# Patient Record
Sex: FEMALE | Race: WHITE | NOT HISPANIC OR LATINO | Employment: FULL TIME | URBAN - METROPOLITAN AREA
[De-identification: names, ages, dates, MRNs, and addresses within clinical notes are randomized per-mention and may not be internally consistent; named-entity substitution may affect disease eponyms.]

---

## 2017-03-01 ENCOUNTER — ALLSCRIPTS OFFICE VISIT (OUTPATIENT)
Dept: OTHER | Facility: OTHER | Age: 52
End: 2017-03-01

## 2017-03-01 DIAGNOSIS — Z12.31 ENCOUNTER FOR SCREENING MAMMOGRAM FOR MALIGNANT NEOPLASM OF BREAST: ICD-10-CM

## 2017-03-13 ENCOUNTER — GENERIC CONVERSION - ENCOUNTER (OUTPATIENT)
Dept: OTHER | Facility: OTHER | Age: 52
End: 2017-03-13

## 2017-03-13 LAB
CHOLEST SERPL-MCNC: 161 MG/DL
GLUCOSE SERPL-MCNC: 90 MG/DL
HDLC SERPL-MCNC: 49 MG/DL
LDLC SERPL CALC-MCNC: 93 MG/DL
TRIGL SERPL-MCNC: 95 MG/DL
TSH SERPL DL<=0.05 MIU/L-ACNC: 3.04 M[IU]/L

## 2017-04-28 ENCOUNTER — HOSPITAL ENCOUNTER (OUTPATIENT)
Dept: RADIOLOGY | Facility: HOSPITAL | Age: 52
Discharge: HOME | End: 2017-04-28
Attending: FAMILY MEDICINE
Payer: COMMERCIAL

## 2017-04-28 ENCOUNTER — GENERIC CONVERSION - ENCOUNTER (OUTPATIENT)
Dept: OTHER | Facility: OTHER | Age: 52
End: 2017-04-28

## 2017-04-28 DIAGNOSIS — Z12.31 ENCOUNTER FOR SCREENING MAMMOGRAM FOR MALIGNANT NEOPLASM OF BREAST: ICD-10-CM

## 2017-04-28 PROCEDURE — G0202 SCR MAMMO BI INCL CAD: HCPCS

## 2017-06-08 ENCOUNTER — HOSPITAL ENCOUNTER (EMERGENCY)
Facility: HOSPITAL | Age: 52
Discharge: HOME | End: 2017-06-09
Attending: EMERGENCY MEDICINE | Admitting: EMERGENCY MEDICINE
Payer: COMMERCIAL

## 2017-06-08 ENCOUNTER — APPOINTMENT (EMERGENCY)
Dept: CT IMAGING | Facility: HOSPITAL | Age: 52
End: 2017-06-08
Payer: COMMERCIAL

## 2017-06-08 DIAGNOSIS — J02.9 PHARYNGITIS: Primary | ICD-10-CM

## 2017-06-08 DIAGNOSIS — J36 TONSILLAR ABSCESS: ICD-10-CM

## 2017-06-08 DIAGNOSIS — J03.90 TONSILLITIS: ICD-10-CM

## 2017-06-08 PROCEDURE — 70491 CT SOFT TISSUE NECK W/DYE: CPT

## 2017-06-09 VITALS
DIASTOLIC BLOOD PRESSURE: 67 MMHG | TEMPERATURE: 97.6 F | SYSTOLIC BLOOD PRESSURE: 110 MMHG | RESPIRATION RATE: 18 BRPM | HEART RATE: 80 BPM | OXYGEN SATURATION: 97 % | WEIGHT: 194.89 LBS

## 2017-06-09 LAB
ALBUMIN SERPL BCP-MCNC: 3.6 G/DL (ref 3.5–5)
ALP SERPL-CCNC: 89 U/L (ref 46–116)
ALT SERPL W P-5'-P-CCNC: 27 U/L (ref 12–78)
ANION GAP SERPL CALCULATED.3IONS-SCNC: 9 MMOL/L (ref 4–13)
AST SERPL W P-5'-P-CCNC: 14 U/L (ref 5–45)
B-HCG SERPL-ACNC: <2 MIU/ML
BASOPHILS # BLD AUTO: 0.05 THOUSANDS/ΜL (ref 0–0.1)
BASOPHILS NFR BLD AUTO: 0 % (ref 0–1)
BILIRUB SERPL-MCNC: 0.4 MG/DL (ref 0.2–1)
BUN SERPL-MCNC: 15 MG/DL (ref 5–25)
CALCIUM SERPL-MCNC: 8.7 MG/DL (ref 8.3–10.1)
CHLORIDE SERPL-SCNC: 102 MMOL/L (ref 100–108)
CO2 SERPL-SCNC: 28 MMOL/L (ref 21–32)
CREAT SERPL-MCNC: 0.71 MG/DL (ref 0.6–1.3)
EOSINOPHIL # BLD AUTO: 0.3 THOUSAND/ΜL (ref 0–0.61)
EOSINOPHIL NFR BLD AUTO: 2 % (ref 0–6)
ERYTHROCYTE [DISTWIDTH] IN BLOOD BY AUTOMATED COUNT: 13.3 % (ref 11.6–15.1)
GFR SERPL CREATININE-BSD FRML MDRD: >60 ML/MIN/1.73SQ M
GLUCOSE SERPL-MCNC: 108 MG/DL (ref 65–140)
HCT VFR BLD AUTO: 41.8 % (ref 34.8–46.1)
HGB BLD-MCNC: 13.5 G/DL (ref 11.5–15.4)
LACTATE SERPL-SCNC: 0.6 MMOL/L (ref 0.5–2)
LYMPHOCYTES # BLD AUTO: 1.34 THOUSANDS/ΜL (ref 0.6–4.47)
LYMPHOCYTES NFR BLD AUTO: 10 % (ref 14–44)
MCH RBC QN AUTO: 30.5 PG (ref 26.8–34.3)
MCHC RBC AUTO-ENTMCNC: 32.3 G/DL (ref 31.4–37.4)
MCV RBC AUTO: 95 FL (ref 82–98)
MONOCYTES # BLD AUTO: 0.99 THOUSAND/ΜL (ref 0.17–1.22)
MONOCYTES NFR BLD AUTO: 8 % (ref 4–12)
NEUTROPHILS # BLD AUTO: 10.22 THOUSANDS/ΜL (ref 1.85–7.62)
NEUTS SEG NFR BLD AUTO: 80 % (ref 43–75)
PLATELET # BLD AUTO: 205 THOUSANDS/UL (ref 149–390)
PMV BLD AUTO: 9.3 FL (ref 8.9–12.7)
POTASSIUM SERPL-SCNC: 4 MMOL/L (ref 3.5–5.3)
PROT SERPL-MCNC: 7.7 G/DL (ref 6.4–8.2)
RBC # BLD AUTO: 4.42 MILLION/UL (ref 3.81–5.12)
S PYO AG THROAT QL: NEGATIVE
SODIUM SERPL-SCNC: 139 MMOL/L (ref 136–145)
WBC # BLD AUTO: 12.9 THOUSAND/UL (ref 4.31–10.16)

## 2017-06-09 PROCEDURE — 99284 EMERGENCY DEPT VISIT MOD MDM: CPT

## 2017-06-09 PROCEDURE — 96375 TX/PRO/DX INJ NEW DRUG ADDON: CPT

## 2017-06-09 PROCEDURE — 87070 CULTURE OTHR SPECIMN AEROBIC: CPT | Performed by: EMERGENCY MEDICINE

## 2017-06-09 PROCEDURE — 83605 ASSAY OF LACTIC ACID: CPT | Performed by: EMERGENCY MEDICINE

## 2017-06-09 PROCEDURE — 96361 HYDRATE IV INFUSION ADD-ON: CPT

## 2017-06-09 PROCEDURE — 87430 STREP A AG IA: CPT | Performed by: EMERGENCY MEDICINE

## 2017-06-09 PROCEDURE — 84702 CHORIONIC GONADOTROPIN TEST: CPT | Performed by: EMERGENCY MEDICINE

## 2017-06-09 PROCEDURE — 36415 COLL VENOUS BLD VENIPUNCTURE: CPT | Performed by: EMERGENCY MEDICINE

## 2017-06-09 PROCEDURE — 87040 BLOOD CULTURE FOR BACTERIA: CPT | Performed by: EMERGENCY MEDICINE

## 2017-06-09 PROCEDURE — 80053 COMPREHEN METABOLIC PANEL: CPT | Performed by: EMERGENCY MEDICINE

## 2017-06-09 PROCEDURE — 85025 COMPLETE CBC W/AUTO DIFF WBC: CPT | Performed by: EMERGENCY MEDICINE

## 2017-06-09 PROCEDURE — 96365 THER/PROPH/DIAG IV INF INIT: CPT

## 2017-06-09 RX ORDER — CLINDAMYCIN HYDROCHLORIDE 150 MG/1
300 CAPSULE ORAL 3 TIMES DAILY
Qty: 60 CAPSULE | Refills: 0 | Status: SHIPPED | OUTPATIENT
Start: 2017-06-09 | End: 2017-06-19

## 2017-06-09 RX ADMIN — IOHEXOL 80 ML: 350 INJECTION, SOLUTION INTRAVENOUS at 01:00

## 2017-06-09 RX ADMIN — DEXAMETHASONE SODIUM PHOSPHATE 10 MG: 10 INJECTION, SOLUTION INTRAMUSCULAR; INTRAVENOUS at 01:19

## 2017-06-09 RX ADMIN — SODIUM CHLORIDE 3 G: 9 INJECTION, SOLUTION INTRAVENOUS at 01:30

## 2017-06-09 RX ADMIN — SODIUM CHLORIDE 1000 ML: 0.9 INJECTION, SOLUTION INTRAVENOUS at 00:17

## 2017-06-11 LAB — BACTERIA THROAT CULT: NORMAL

## 2017-06-14 LAB
BACTERIA BLD CULT: NORMAL
BACTERIA BLD CULT: NORMAL

## 2018-01-12 VITALS
RESPIRATION RATE: 16 BRPM | WEIGHT: 204 LBS | BODY MASS INDEX: 37.54 KG/M2 | HEART RATE: 80 BPM | DIASTOLIC BLOOD PRESSURE: 78 MMHG | TEMPERATURE: 98.4 F | HEIGHT: 62 IN | SYSTOLIC BLOOD PRESSURE: 122 MMHG

## 2018-01-15 NOTE — RESULT NOTES
Discussion/Summary   Your mammogram is normal   Please repeat mammogram in 1 year  Dr Davion Ortiz CAD 28Apr2017 09:36AM Chidi Meier Order Number: [id number]    - Patient Instructions: To schedule this appointment, please contact Central Scheduling at [phone]  Do not wear any perfume, powder, lotion or deodorant on breast or underarm area  Please bring your doctors order, referral (if needed) and insurance information with you on the day of the test  Failure to bring this information may result in this test being rescheduled  Arrive 15 minutes prior to your appointment time to register  On the day of your test, please bring any prior mammogram or breast studies with you that were not performed at a [facility]  Failure to bring prior exams may result in your test needing to be rescheduled  Test Name Result Flag Reference   MAMMO SCREENING BILATERAL W CAD (Report)     Patient History:   Patient's BMI is 36 1  Reason for exam: screening, asymptomatic  Screening     Mammo Screening Bilateral W CAD: April 28, 2017 - Check In #:    [de-identified]   Bilateral CC and MLO view(s) were taken  Technologist: Isaias Mortimer, RTRM   Prior study comparison: November 4, 2011, bilateral screening    mammogram performed at [place] [address]  August 10, 2009, bilateral diagnostic mammogram performed at [place] [address]  The breast tissue is heterogeneously dense, potentially limiting    the sensitivity of mammography  Patient risk, included in this    report, assists in determining the appropriate screening regimen    (such as 3-D mammography or the inclusion of automated breast    ultrasound or MRI)  3-D mammography may also remain indicated as    screening  Bilateral digital mammography is performed  No    dominant soft tissue mass, architectural distortion or suspicious   calcifications are noted  The skin and nipple contours are    within normal limits  Scattered benign appearing calcifications    are noted as are bilateral intramammary lymph nodes  No evidence    of malignancy  No significant changes when compared to prior    studies  1  No evidence of malignancy  2  No significant change when compared with the prior study  ACR BI-RADSï¾® Assessments: BiRad:2 - Benign     Recommendation:   Routine screening mammogram of both breasts in 1 year  A reminder letter will be scheduled   Analyzed by CAD     8-10% of cancers will be missed on mammography  Management of a    palpable abnormality must be based on clinical grounds  Patients   will be notified of their results via letter from our facility  Accredited by [employer] of [employer] and FDA       Transcription Location: VANESA Hurley 98: [id number]     Risk Value(s):   Tyrer-Cuzick 10 Year: 1 700%, Tyrer-Cuzick Lifetime: 6 800%,    Myriad Table: 1 5%, ROHITH 5 Year: 0 7%, NCI Lifetime: 5 8%   Signed by:   Aneta Rahman MD   4/28/17

## 2018-01-18 NOTE — PROGRESS NOTES
Assessment    1  History of Well adult exam (V70 0) (Z00 00)   2  Obesity (278 00) (E66 9)   3  Screen for colon cancer (V76 51) (Z12 11)   4  Encounter for screening mammogram for malignant neoplasm of breast (V76 12)   (Z12 31)   5  Anxiety (300 00) (F41 9)   6  Need for Tdap vaccination (V06 1) (Z23)   7  HLD (hyperlipidemia) (272 4) (E78 5)    Plan  Anxiety    · ALPRAZolam 0 25 MG Oral Tablet (Xanax); TAKE 1 TABLET AT BEDTIME  Encounter for screening mammogram for malignant neoplasm of breast    · * MAMMO SCREENING BILATERAL W CAD; Status:Hold For - Scheduling; Requested  for:08Apr2016;   HLD (hyperlipidemia), Obesity    · (1) CBC/PLT/DIFF; Status:Active; Requested for:13May2016;    · (1) COMPREHENSIVE METABOLIC PANEL; Status:Active; Requested for:13May2016;    · (1) LIPID PANEL, FASTING; Status:Active; Requested for:13May2016;    · (1) TSH; Status:Active; Requested for:13May2016;    · (1) URINALYSIS (will reflex a microscopy if leukocytes, occult blood, protein or nitrites  are not within normal limits); Status:Active; Requested for:13May2016;    · Eat no more than 30 grams of fat per day ; Status:Complete;   Done: 74MEY0779   · Some eating tips that can help you lose weight ; Status:Complete;   Done: 19WBV5818  Need for Tdap vaccination    · Stop: Adacel 5-2-15 5 LF-MCG/0 5 Intramuscular Suspension  Screen for colon cancer    · COLONOSCOPY; Status:Active; Requested for:08Apr2016;    · 1 - Hollie Olivera MD, Awais Fajardo (Gastroenterology) Physician Referral  Consult  Status: Active   Requested for: 08Apr2016  Care Summary provided  : Yes   · Follow-up visit in 1 month Evaluation and Treatment  Follow-up  Status: Hold For -  Scheduling  Requested for: 08Apr2016    Discussion/Summary    Well exam- pap is UTD, will give script for mammogram and c-scope   -refused TDaP this year  obesity- pt is getting an elliptical machine and we discussed heart healthy diet  anxiety-well controlled   pt uses xanax sparingly ---refill given  hyperlipidemia- discussed starting medication, however pt would like to work on diet and exerise  I explained to patient that with family hx of CAD- I don't want to wait for medication for more than 1 month  will repeat bw in 1 month will f/u in office  Chief Complaint  Physical for work  History of Present Illness  HM, Adult Female: The patient is being seen for a health maintenance evaluation  The last health maintenance visit was 1 year(s) ago  General Health: The patient's health since the last visit is described as good  She complains of vision problems  Immunizations status: not up to date   blind since birth  Lifestyle:  She does not have a healthy diet  She has weight concerns  She does not exercise regularly  She does not use tobacco    Screening: Cervical cancer screening includes sees gyn but last pap approx 1 year ago  Breast cancer screening includes uncertain timing of her last mammogram  Colorectal cancer screening includes no previous colonoscopy  Metabolic screening includes lipid profile performed 4/4/16 and glucose screening performed 4/4/16  HPI: Pt is here for physical  Wellness exam for work  Pt is due for mammogram  Pt's last gyn exam was approx 1 year ago  Pt needs a colonoscopy script but stated that she is unsure if she will get it this year  Pt is working on her diet  Pt is getting an elliptical machine  Pt stated that she has occasional anxiety from work and will take xanax as needed  Last refilled 11/15  After exam was completed but still in the room, lipid profile resulted and abnormal  total cholesterol=220  hdl=47  GG=371  UEI=438      Active Problems    1  Anxiety (300 00) (F41 9)   2  Edema (782 3) (R60 9)   3  Encounter for screening mammogram for malignant neoplasm of breast (V76 12)   (Z12 31)   4  Lump of skin (782 2) (R22 9)   5  Obesity (278 00) (E66 9)   6   Screen for colon cancer (V76 51) (Z12 11)    Past Medical History    · History of acute bronchitis (V12 69) (Z87 09)   · History of obesity (V13 89) (Z86 39)   · History of Lump of left breast (611 72) (N63)   · History of Skin lesion (709 9) (L98 9)   · History of Well adult exam (V70 0) (Z00 00)    Surgical History    · History of Cholecystectomy    Family History    · Family history of Coronary Artery Disease (V17 49)   · Family history of No Significant Family History    Social History    · Being A Social Drinker   · Never A Smoker    Current Meds   1  ALPRAZolam 0 25 MG Oral Tablet; TAKE 1 TABLET AT BEDTIME; Therapy: 10Xsl3275 to (Evaluate:33Qaw1541); Last Rx:02Nov2015 Ordered    Allergies    1  No Known Drug Allergies    Vitals   Recorded: 08Apr2016 03:41PM   Temperature 99 4 F   Heart Rate 81   Respiration 18   Systolic 480   Diastolic 80   Height 5 ft 2 in   Weight 211 lb    BMI Calculated 38 59   BSA Calculated 1 96   O2 Saturation 97     Physical Exam    Constitutional   General appearance: Abnormal   obese  Head and Face   Head and face: Normal     Eyes   Conjunctiva and lids: No swelling, erythema or discharge  Pupils and irises: Abnormal   left strabismus + horizontal nystagmus  Ears, Nose, Mouth, and Throat   External inspection of ears and nose: Normal     Otoscopic examination: Tympanic membranes translucent with normal light reflex  Canals patent without erythema  Nasal mucosa, septum, and turbinates: Normal without edema or erythema  Lips, teeth, and gums: Normal, good dentition  Oropharynx: Normal with no erythema, edema, exudate or lesions  Neck   Neck: Supple, symmetric, trachea midline, no masses  Thyroid: Normal, no thyromegaly  Pulmonary   Respiratory effort: No increased work of breathing or signs of respiratory distress  Auscultation of lungs: Clear to auscultation  Cardiovascular   Auscultation of heart: Normal rate and rhythm, normal S1 and S2, no murmurs  Carotid pulses: 2+ bilaterally      Abdominal aorta: Normal     Pedal pulses: 2+ bilaterally  Examination of extremities for edema and/or varicosities: Normal     Abdomen   Abdomen: Non-tender, no masses  Liver and spleen: No hepatomegaly or splenomegaly  Lymphatic   Palpation of lymph nodes in neck: No lymphadenopathy  Musculoskeletal   Gait and station: Normal     Range of motion: Normal     Muscle strength/tone: Normal     Neurologic   Reflexes: 2+ and symmetric  Psychiatric   Judgment and insight: Normal     Orientation to person, place, and time: Normal     Mood and affect: Normal        Results/Data  PHQ-2 Adult Depression Screening 08Apr2016 03:52PM [facility]     Test Name Result Flag Reference   PHQ-2 Adult Depression Score 0     Q1: 0, Q2: 0   PHQ-2 Adult Depression Screening Negative         Signatures   Electronically signed by : Rj Bishop DO;  Apr 8 2016  4:30PM EST                       (Author)

## 2018-04-16 ENCOUNTER — OFFICE VISIT (OUTPATIENT)
Dept: FAMILY MEDICINE CLINIC | Facility: CLINIC | Age: 53
End: 2018-04-16
Payer: COMMERCIAL

## 2018-04-16 VITALS
TEMPERATURE: 97.6 F | HEIGHT: 61 IN | DIASTOLIC BLOOD PRESSURE: 78 MMHG | SYSTOLIC BLOOD PRESSURE: 122 MMHG | BODY MASS INDEX: 35.98 KG/M2 | HEART RATE: 80 BPM | RESPIRATION RATE: 16 BRPM | WEIGHT: 190.6 LBS

## 2018-04-16 DIAGNOSIS — Z00.00 PHYSICAL EXAM, ANNUAL: Primary | ICD-10-CM

## 2018-04-16 DIAGNOSIS — Z12.11 SCREENING FOR COLORECTAL CANCER: ICD-10-CM

## 2018-04-16 DIAGNOSIS — Z12.12 SCREENING FOR COLORECTAL CANCER: ICD-10-CM

## 2018-04-16 DIAGNOSIS — Z12.39 SCREENING FOR MALIGNANT NEOPLASM OF BREAST: ICD-10-CM

## 2018-04-16 DIAGNOSIS — F41.9 ANXIETY: ICD-10-CM

## 2018-04-16 DIAGNOSIS — E78.2 MIXED HYPERLIPIDEMIA: ICD-10-CM

## 2018-04-16 PROCEDURE — 99396 PREV VISIT EST AGE 40-64: CPT | Performed by: FAMILY MEDICINE

## 2018-04-16 RX ORDER — ALPRAZOLAM 0.25 MG/1
0.25 TABLET ORAL
Qty: 30 TABLET | Refills: 0 | Status: SHIPPED | OUTPATIENT
Start: 2018-04-16 | End: 2018-05-03 | Stop reason: SDUPTHER

## 2018-04-16 RX ORDER — ALPRAZOLAM 0.25 MG/1
1 TABLET ORAL
COMMUNITY
Start: 2013-09-17 | End: 2018-04-16 | Stop reason: SDUPTHER

## 2018-04-16 NOTE — PATIENT INSTRUCTIONS
Reviewed with patient the appropriate health maintenance testing  needed  Discussed the importance of diet and exercise and the role it plays in prevention of disease  Discussed with patient the importance of emotional health as well as physical health  Immunizations reviewed and discussed  Long discussion regarding dietary modification with types of food and portion control  Discussed how small goals will turn into larger goals  Discussed carbohydrate choices  Discussed how sugar is the enemy to avoid

## 2018-04-16 NOTE — PROGRESS NOTES
FAMILY PRACTICE HEALTH MAINTENANCE OFFICE VISIT  [facility] - [place]    NAME: Eagle Knight  AGE: 47 y o  SEX: female  : 1965     DATE: 2018    Assessment and Plan     Problem List Items Addressed This Visit     Hyperlipidemia    Anxiety    Relevant Medications    ALPRAZolam (XANAX) 0 25 mg tablet      Other Visit Diagnoses     Physical exam, annual    -  Primary    BMI 36 0-36 9,adult        Screening for malignant neoplasm of breast        Relevant Orders    Mammo screening bilateral w cad    Screening for colorectal cancer        Relevant Orders    Ambulatory referral to Gastroenterology          Reviewed with patient the appropriate health maintenance testing  needed  Discussed the importance of diet and exercise and the role it plays in prevention of disease  Discussed with patient the importance of emotional health as well as physical health  Immunizations reviewed and discussed  Long discussion regarding dietary modification with types of food and portion control  Discussed how small goals will turn into larger goals  Discussed carbohydrate choices  Discussed how sugar is the enemy to avoid  · Patient Counseling:   · Nutrition: Stressed importance of a well balanced diet, moderation of sodium/saturated fat, caloric balance and sufficient intake of fiber  · Exercise: Stressed the importance of regular exercise with a goal of 150 minutes per week  · Dental Health: Discussed daily flossing and brushing and regular dental visits   · Injury Prevention: Discussed Safety Belts, Safety Helmets, and Smoke Detectors    · Immunizations reviewed  Refused   · Discussed benefits of screening mammo and c-scope  · Discussed the patient's BMI with her  The BMI is above average; BMI management plan is completed     Return if symptoms worsen or fail to improve          Chief Complaint     Chief Complaint   Patient presents with    Physical Exam       History of Present Illness     Pt seen and examined  Here for CPE  Scheduled mammogram  Needs new referral for c-scope    Feels well  Pt is doing well with diet and exercise  Lost 14lbs since last visit  At the gym 4-5 days a week  Pt is staying away from carbs  Pt had health risk assessment at work and score went down  ldl increased to 156  tg wnl  Hdl up to 58  bp wnl  Total chol increased to 238              Well Adult Physical   Patient here for a comprehensive physical exam       Diet and Physical Activity  Diet: well balanced diet  Weight concerns: Patient has class 2 obesity (BMI 35 0-39  9)  Exercise: frequently      Depression Screen  PHQ-9 Depression Screening    PHQ-9:    Frequency of the following problems over the past two weeks:       Little interest or pleasure in doing things:  0 - not at all  Feeling down, depressed, or hopeless:  0 - not at all  PHQ-2 Score:  0          General Health  Hearing: Normal:  bilateral  Vision: legally blind  Dental:     Reproductive Health  Not addressed     The following portions of the patient's history were reviewed and updated as appropriate: allergies, current medications, past family history, past medical history, past social history, past surgical history and problem list     Review of Systems     Review of Systems   Constitutional: Negative for activity change, appetite change and fatigue  Eyes: Positive for visual disturbance  Respiratory: Negative for cough and chest tightness  Cardiovascular: Negative for chest pain, palpitations and leg swelling  Gastrointestinal: Negative for abdominal pain, constipation, diarrhea, nausea and vomiting  Genitourinary: Negative for difficulty urinating  Musculoskeletal: Negative for arthralgias and myalgias  Neurological: Negative for dizziness, weakness and headaches  Hematological: Negative for adenopathy  Psychiatric/Behavioral: Negative for behavioral problems  The patient is not nervous/anxious          Past Medical History     Past Medical History:   Diagnosis Date    Lump of left breast     Obesity     Skin lesion        Past Surgical History     Past Surgical History:   Procedure Laterality Date    CHOLECYSTECTOMY         Social History     Social History     Social History    Marital status: /Civil Union     Spouse name: N/A    Number of children: N/A    Years of education: N/A     Social History Main Topics    Smoking status: Never Smoker    Smokeless tobacco: Never Used    Alcohol use No      Comment: social drinker per allscripts     Drug use: No    Sexual activity: Not Asked     Other Topics Concern    None     Social History Narrative    None       Family History     Family History   Problem Relation Age of Onset    Heart disease Mother      cardiac disorder     Coronary artery disease Family        Current Medications       Current Outpatient Prescriptions:     ALPRAZolam (XANAX) 0 25 mg tablet, Take 1 tablet (0 25 mg total) by mouth daily at bedtime as needed for anxiety, Disp: 30 tablet, Rfl: 0     Allergies     No Known Allergies    Objective     /78 (BP Location: Left arm, Patient Position: Sitting, Cuff Size: Standard)   Pulse 80   Temp 97 6 °F (36 4 °C)   Resp 16   Ht 5' 1" (1 549 m)   Wt 86 5 kg (190 lb 9 6 oz)   BMI 36 01 kg/m²      Physical Exam   Constitutional: She is oriented to person, place, and time  She appears well-developed and well-nourished  No distress  HENT:   Head: Normocephalic and atraumatic  Right Ear: External ear normal    Left Ear: External ear normal    Nose: Nose normal    Mouth/Throat: Oropharynx is clear and moist    Eyes: No scleral icterus  Neck: Normal range of motion  Neck supple  No JVD present  No thyromegaly present  Cardiovascular: Normal rate, regular rhythm, normal heart sounds and intact distal pulses  Exam reveals no gallop and no friction rub  No murmur heard    Pulmonary/Chest: Effort normal and breath sounds normal  No respiratory distress  She has no wheezes  She has no rales  Abdominal: Soft  Bowel sounds are normal  There is no tenderness  There is no rebound and no guarding  Musculoskeletal: Normal range of motion  She exhibits no edema  Lymphadenopathy:     She has no cervical adenopathy  Neurological: She is alert and oriented to person, place, and time  She has normal reflexes  No cranial nerve deficit  Skin: Skin is warm and dry  Psychiatric: She has a normal mood and affect  Judgment and thought content normal          No exam data present    Health Maintenance     Health Maintenance   Topic Date Due    HIV SCREENING  1965    Hepatitis C Screening  1965    COLONOSCOPY  1965    Depression Screening PHQ-9  08/13/1976    DTaP,Tdap,and Td Vaccines (1 - Tdap) 08/13/1985    INFLUENZA VACCINE  09/01/2017    MAMMOGRAM  04/28/2018     There is no immunization history for the selected administration types on file for this patient      Verenice Arredondo DO  [address]

## 2018-04-16 NOTE — LETTER
April 16, 2018     Patient: Scott Richardson   YOB: 1965   Date of Visit: 4/16/2018       To Whom it May Concern:    Scott Richardson is under my professional care  She was seen in my office on 4/16/2018  She was here for a physical exam     If you have any questions or concerns, please don't hesitate to call           Sincerely,          Kendell Arnold DO        CC: No Recipients

## 2018-05-03 DIAGNOSIS — F41.9 ANXIETY: ICD-10-CM

## 2018-05-03 RX ORDER — ALPRAZOLAM 0.25 MG/1
0.25 TABLET ORAL
Qty: 30 TABLET | Refills: 1 | Status: SHIPPED | OUTPATIENT
Start: 2018-05-03 | End: 2019-09-04 | Stop reason: SDUPTHER

## 2019-09-04 ENCOUNTER — OFFICE VISIT (OUTPATIENT)
Dept: FAMILY MEDICINE CLINIC | Facility: CLINIC | Age: 54
End: 2019-09-04
Payer: COMMERCIAL

## 2019-09-04 VITALS
DIASTOLIC BLOOD PRESSURE: 70 MMHG | WEIGHT: 206.6 LBS | TEMPERATURE: 98.7 F | BODY MASS INDEX: 38.02 KG/M2 | RESPIRATION RATE: 16 BRPM | HEIGHT: 62 IN | HEART RATE: 80 BPM | SYSTOLIC BLOOD PRESSURE: 122 MMHG

## 2019-09-04 DIAGNOSIS — F41.9 ANXIETY: ICD-10-CM

## 2019-09-04 DIAGNOSIS — S30.1XXA CONTUSION OF ABDOMINAL WALL, INITIAL ENCOUNTER: ICD-10-CM
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
## 2019-09-04 PROCEDURE — 99213 OFFICE O/P EST LOW 20 MIN: CPT | Performed by: FAMILY MEDICINE

## 2019-09-05 RX ORDER — ALPRAZOLAM 0.25 MG/1
0.25 TABLET ORAL
Qty: 15 TABLET | Refills: 0 | Status: SHIPPED | OUTPATIENT
Start: 2019-09-05 | End: 2020-03-11 | Stop reason: SDUPTHER
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
## 2020-03-11 ENCOUNTER — OFFICE VISIT (OUTPATIENT)
Dept: FAMILY MEDICINE CLINIC | Facility: CLINIC | Age: 55
End: 2020-03-11
Payer: COMMERCIAL

## 2020-03-11 VITALS
HEIGHT: 61 IN | WEIGHT: 201.8 LBS | BODY MASS INDEX: 38.1 KG/M2 | HEART RATE: 72 BPM | RESPIRATION RATE: 16 BRPM | OXYGEN SATURATION: 97 %

## 2020-03-11 DIAGNOSIS — Z12.11 SCREENING FOR COLORECTAL CANCER: ICD-10-CM

## 2020-03-11 DIAGNOSIS — H54.8 LEGAL BLINDNESS USA: ICD-10-CM

## 2020-03-11 DIAGNOSIS — Z12.12 SCREENING FOR COLORECTAL CANCER: ICD-10-CM

## 2020-03-11 DIAGNOSIS — F41.9 ANXIETY: ICD-10-CM

## 2020-03-11 DIAGNOSIS — Z13.6 SCREENING FOR CARDIOVASCULAR CONDITION: ICD-10-CM

## 2020-03-11 DIAGNOSIS — Z13.1 SCREENING FOR DIABETES MELLITUS: ICD-10-CM

## 2020-03-11 DIAGNOSIS — E78.00 PURE HYPERCHOLESTEROLEMIA: ICD-10-CM

## 2020-03-11 DIAGNOSIS — R53.83 OTHER FATIGUE: ICD-10-CM

## 2020-03-11 DIAGNOSIS — Z12.31 ENCOUNTER FOR SCREENING MAMMOGRAM FOR BREAST CANCER: ICD-10-CM

## 2020-03-11 DIAGNOSIS — Z00.00 ANNUAL PHYSICAL EXAM: Primary | ICD-10-CM

## 2020-03-11 PROCEDURE — 3008F BODY MASS INDEX DOCD: CPT | Performed by: FAMILY MEDICINE

## 2020-03-11 PROCEDURE — 99386 PREV VISIT NEW AGE 40-64: CPT | Performed by: FAMILY MEDICINE

## 2020-03-11 RX ORDER — ALPRAZOLAM 0.5 MG/1
0.5 TABLET ORAL 2 TIMES DAILY PRN
Qty: 40 TABLET | Refills: 2 | Status: SHIPPED | OUTPATIENT
Start: 2020-03-11
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
## 2020-03-11 NOTE — PATIENT INSTRUCTIONS

## 2020-06-15 ENCOUNTER — OFFICE VISIT (OUTPATIENT)
Dept: FAMILY MEDICINE CLINIC | Facility: CLINIC | Age: 55
End: 2020-06-15

## 2020-06-15 VITALS
OXYGEN SATURATION: 98 % | DIASTOLIC BLOOD PRESSURE: 72 MMHG | BODY MASS INDEX: 38.61 KG/M2 | SYSTOLIC BLOOD PRESSURE: 112 MMHG | RESPIRATION RATE: 16 BRPM | WEIGHT: 205 LBS | HEART RATE: 68 BPM | TEMPERATURE: 98.8 F

## 2020-06-15 DIAGNOSIS — F41.9 ANXIETY: ICD-10-CM

## 2020-06-15 DIAGNOSIS — E78.00 PURE HYPERCHOLESTEROLEMIA: Primary | ICD-10-CM

## 2020-06-15 PROCEDURE — 99213 OFFICE O/P EST LOW 20 MIN: CPT | Performed by: FAMILY MEDICINE

## 2020-06-15 PROCEDURE — 1036F TOBACCO NON-USER: CPT | Performed by: FAMILY MEDICINE

## 2021-04-14 ENCOUNTER — IMMUNIZATIONS (OUTPATIENT)
Dept: FAMILY MEDICINE CLINIC | Facility: HOSPITAL | Age: 56
End: 2021-04-14

## 2021-04-14 DIAGNOSIS — Z23 ENCOUNTER FOR IMMUNIZATION: Primary | ICD-10-CM

## 2021-04-14 PROCEDURE — 0001A SARS-COV-2 / COVID-19 MRNA VACCINE (PFIZER-BIONTECH) 30 MCG: CPT

## 2021-04-14 PROCEDURE — 91300 SARS-COV-2 / COVID-19 MRNA VACCINE (PFIZER-BIONTECH) 30 MCG: CPT

## 2021-05-06 ENCOUNTER — IMMUNIZATIONS (OUTPATIENT)
Dept: FAMILY MEDICINE CLINIC | Facility: HOSPITAL | Age: 56
End: 2021-05-06

## 2021-05-06 DIAGNOSIS — Z23 ENCOUNTER FOR IMMUNIZATION: Primary | ICD-10-CM

## 2021-05-06 PROCEDURE — 0002A SARS-COV-2 / COVID-19 MRNA VACCINE (PFIZER-BIONTECH) 30 MCG: CPT

## 2021-05-06 PROCEDURE — 91300 SARS-COV-2 / COVID-19 MRNA VACCINE (PFIZER-BIONTECH) 30 MCG: CPT

## 2021-07-27 ENCOUNTER — OFFICE VISIT (OUTPATIENT)
Dept: FAMILY MEDICINE CLINIC | Facility: CLINIC | Age: 56
End: 2021-07-27
Payer: COMMERCIAL

## 2021-07-27 VITALS
BODY MASS INDEX: 37.94 KG/M2 | SYSTOLIC BLOOD PRESSURE: 126 MMHG | OXYGEN SATURATION: 96 % | DIASTOLIC BLOOD PRESSURE: 80 MMHG | HEIGHT: 62 IN | WEIGHT: 206.2 LBS | RESPIRATION RATE: 14 BRPM | HEART RATE: 66 BPM | TEMPERATURE: 99.6 F

## 2021-07-27 DIAGNOSIS — Z12.4 SCREENING FOR CERVICAL CANCER: ICD-10-CM

## 2021-07-27 DIAGNOSIS — Z12.12 SCREENING FOR COLORECTAL CANCER: ICD-10-CM

## 2021-07-27 DIAGNOSIS — R53.83 FATIGUE, UNSPECIFIED TYPE: ICD-10-CM

## 2021-07-27 DIAGNOSIS — Z13.6 SCREENING FOR CARDIOVASCULAR CONDITION: ICD-10-CM

## 2021-07-27 DIAGNOSIS — Z12.11 SCREENING FOR COLORECTAL CANCER: ICD-10-CM

## 2021-07-27 DIAGNOSIS — Z00.00 ANNUAL PHYSICAL EXAM: Primary | ICD-10-CM

## 2021-07-27 DIAGNOSIS — Z13.1 SCREENING FOR DIABETES MELLITUS: ICD-10-CM

## 2021-07-27 DIAGNOSIS — F32.A DEPRESSION, UNSPECIFIED DEPRESSION TYPE: ICD-10-CM

## 2021-07-27 DIAGNOSIS — Z12.31 ENCOUNTER FOR SCREENING MAMMOGRAM FOR BREAST CANCER: ICD-10-CM

## 2021-07-27 PROCEDURE — 3008F BODY MASS INDEX DOCD: CPT | Performed by: FAMILY MEDICINE

## 2021-07-27 PROCEDURE — 1036F TOBACCO NON-USER: CPT | Performed by: FAMILY MEDICINE

## 2021-07-27 PROCEDURE — 3725F SCREEN DEPRESSION PERFORMED: CPT | Performed by: FAMILY MEDICINE

## 2021-07-27 PROCEDURE — 99396 PREV VISIT EST AGE 40-64: CPT | Performed by: FAMILY MEDICINE

## 2021-07-27 NOTE — PROGRESS NOTES
BMI Counseling: Body mass index is 37 66 kg/m²  The BMI is above normal  Nutrition recommendations include encouraging healthy choices of fruits and vegetables and moderation in carbohydrate intake  Exercise recommendations include exercising 3-5 times per week

## 2021-07-27 NOTE — PATIENT INSTRUCTIONS

## 2021-07-27 NOTE — PROGRESS NOTES
[address]    NAME: Lexy Cho  AGE: 63 y o  SEX: female  : 1965     DATE: 2021     Assessment and Plan:     Problem List Items Addressed This Visit     None      Visit Diagnoses     Annual physical exam    -  Primary    Holly is stable on exam  Most of her screenings were not done, due to her 's health issues  Continue a lower carb diet, & regular exercise  Encounter for screening mammogram for breast cancer        Mammogram ordered again for the pt today  Relevant Orders    Mammo screening bilateral w cad    Screening for cervical cancer        Pt referred locally to OB/GYN  Relevant Orders    Ambulatory referral to Obstetrics / Gynecology    Screening for colorectal cancer        Pt prefers Cologuard option - she has no known hx of polyps/IBD, and no family hx for colon cancer  Relevant Orders    Cologuard    Screening for diabetes mellitus        Relevant Orders    Comprehensive metabolic panel    Screening for cardiovascular condition        Relevant Orders    Lipid Panel with Direct LDL reflex    Fatigue, unspecified type        Relevant Orders    TSH, 3rd generation with Free T4 reflex    CBC and differential    Depression, unspecified depression type        Pt is doing Grief Counseling, and using Alprazolam appropriately  I wished Holly today my most heartfelt condolences, & told her to call if she needs anything          Immunizations and preventive care screenings were discussed with patient today  Appropriate education was printed on patient's after visit summary  Counseling:  Dental Health: discussed importance of regular tooth brushing, flossing, and dental visits  · Exercise: the importance of regular exercise/physical activity was discussed  Recommend exercise 3-5 times per week for at least 30 minutes  Return in 6 months (on 2022) for Recheck       Chief Complaint:     Chief Complaint   Patient presents with    Physical Exam     patient being seen for physical       History of Present Illness:     Adult Annual Physical   Patient here for a comprehensive physical exam  The patient reports no problems  Seeing Holly for the first time since 06/2020  She unfortunately, sadly lost her  2 months ago to a brain tumor  She is seeing a Grief Counselor now, only takes Alprazolam PRN  [place] PDMP checked, and appropriate  Pt declines a daily med at this time  Work stresses 9pt cannot drive due to legal blindness), financial stresses, etc   No HI/SI  She did complete the [employer] vaccine series in 05/2021  Diet and Physical Activity  · Diet/Nutrition: well balanced diet  · Exercise: 3-4 times a week on average  Depression Screening  PHQ-9 Depression Screening    PHQ-9:   Frequency of the following problems over the past two weeks:      Little interest or pleasure in doing things: 1 - several days  Feeling down, depressed, or hopeless: 0 - not at all  PHQ-2 Score: 1       General Health  · Sleep: sleeps poorly  · Hearing: normal - bilateral   · Vision: Chronic leagal blindness      · Dental: no dental visits for >1 year  We discussed  /GYN Health  · Patient is: perimenopausal  · Last menstrual period: Regular menses  · Contraceptive method: None  Review of Systems:     Review of Systems   Constitutional: Negative for activity change  Respiratory: Negative for shortness of breath  Cardiovascular: Negative for chest pain  Gastrointestinal: Negative for abdominal pain and blood in stool  Genitourinary: Negative for menstrual problem  No new breast lumps on self examination  Psychiatric/Behavioral: Positive for dysphoric mood  Negative for suicidal ideas  The patient is nervous/anxious  No HI/SI        Past Medical History:     Past Medical History:   Diagnosis Date    Lump of left breast     Obesity     Skin lesion       Past Surgical History:     Past Surgical History:   Procedure Laterality Date    CHOLECYSTECTOMY        Social History:     Social History     Socioeconomic History    Marital status: /Civil Union     Spouse name: None    Number of children: None    Years of education: None    Highest education level: None   Occupational History    None   Tobacco Use    Smoking status: Never Smoker    Smokeless tobacco: Never Used   Substance and Sexual Activity    Alcohol use: Yes     Comment: social drinker per allscripts     Drug use: No    Sexual activity: None   Other Topics Concern    None   Social History Narrative    None     Social Determinants of Health     Financial Resource Strain:     Difficulty of Paying Living Expenses:    Food Insecurity:     Worried About Running Out of Food in the Last Year:     Ran Out of Food in the Last Year:    Transportation Needs:     Lack of Transportation (Medical):      Lack of Transportation (Non-Medical):    Physical Activity:     Days of Exercise per Week:     Minutes of Exercise per Session:    Stress:     Feeling of Stress :    Social Connections:     Frequency of Communication with Friends and Family:     Frequency of Social Gatherings with Friends and Family:     Attends Yarsani Services:     Active Member of Clubs or Organizations:     Attends Club or Organization Meetings:     Marital Status:    Intimate Partner Violence:     Fear of Current or Ex-Partner:     Emotionally Abused:     Physically Abused:     Sexually Abused:       Family History:     Family History   Problem Relation Age of Onset    Heart disease Mother         cardiac disorder     Coronary artery disease Family       Current Medications:     Current Outpatient Medications   Medication Sig Dispense Refill    ALPRAZolam (XANAX) 0 5 mg tablet Take 1 tablet (0 5 mg total) by mouth 2 (two) times a day as needed for anxiety 40 tablet 2     No current facility-administered medications for this visit  Allergies:     No Known Allergies   Physical Exam:     /80 (BP Location: Left arm, Patient Position: Sitting, Cuff Size: Standard)   Pulse 66   Temp 99 6 °F (37 6 °C) (Tympanic)   Resp 14   Ht 5' 2 05" (1 576 m)   Wt 93 5 kg (206 lb 3 2 oz)   SpO2 96%   BMI 37 66 kg/m²     Physical Exam  Vitals and nursing note reviewed  Constitutional:       General: She is not in acute distress  Appearance: Normal appearance  She is not ill-appearing, toxic-appearing or diaphoretic  Comments: Pt with likely chronic amblyopia of the right eye  HENT:      Head: Normocephalic and atraumatic  Eyes:      General: No scleral icterus  Conjunctiva/sclera: Conjunctivae normal    Cardiovascular:      Rate and Rhythm: Normal rate and regular rhythm  Heart sounds: Normal heart sounds  No murmur heard  No friction rub  No gallop  Pulmonary:      Effort: Pulmonary effort is normal  No respiratory distress  Breath sounds: Normal breath sounds  No stridor  No wheezing, rhonchi or rales  Abdominal:      General: Abdomen is flat  Bowel sounds are normal  There is no distension  Palpations: Abdomen is soft  There is no mass  Tenderness: There is no abdominal tenderness  There is no guarding or rebound  Musculoskeletal:      Cervical back: Normal range of motion and neck supple  No rigidity or tenderness  Lymphadenopathy:      Cervical: No cervical adenopathy  Neurological:      Mental Status: She is alert and oriented to person, place, and time  Psychiatric:         Mood and Affect: Mood is depressed  Affect is tearful  Speech: Speech normal          Behavior: Behavior normal          Thought Content:  Thought content normal          Judgment: Judgment normal       Holly was seen today for physical exam     Diagnoses and all orders for this visit:    Annual physical exam  Comments:  Holly is stable on exam  Most of her screenings were not done, due to her 's health issues  Continue a lower carb diet, & regular exercise  Encounter for screening mammogram for breast cancer  Comments:  Mammogram ordered again for the pt today  Orders:  -     Mammo screening bilateral w cad; Future    Screening for cervical cancer  Comments:  Pt referred locally to OB/GYN  Orders:  -     Ambulatory referral to Obstetrics / Gynecology; Future    Screening for colorectal cancer  Comments:  Pt prefers Cologuard option - she has no known hx of polyps/IBD, and no family hx for colon cancer  Orders:  -     Cologuard; Future    Screening for diabetes mellitus  -     Comprehensive metabolic panel; Future    Screening for cardiovascular condition  -     Lipid Panel with Direct LDL reflex; Future    Fatigue, unspecified type  -     TSH, 3rd generation with Free T4 reflex; Future  -     CBC and differential; Future    Depression, unspecified depression type  Comments:  Pt is doing Grief Counseling, and using Alprazolam appropriately    I wished Holly today my most heartfelt condolences, & told her to call if she needs anything          Roger [address]ero, [address]

## 2021-10-06 ENCOUNTER — TELEPHONE (OUTPATIENT)
Dept: FAMILY MEDICINE CLINIC | Facility: CLINIC | Age: 56
End: 2021-10-06

## 2022-01-27 ENCOUNTER — OFFICE VISIT (OUTPATIENT)
Dept: FAMILY MEDICINE CLINIC | Facility: CLINIC | Age: 57
End: 2022-01-27
Payer: COMMERCIAL

## 2022-01-27 VITALS
BODY MASS INDEX: 38.31 KG/M2 | RESPIRATION RATE: 12 BRPM | HEART RATE: 81 BPM | DIASTOLIC BLOOD PRESSURE: 82 MMHG | SYSTOLIC BLOOD PRESSURE: 120 MMHG | OXYGEN SATURATION: 97 % | TEMPERATURE: 99.7 F | WEIGHT: 208.2 LBS | HEIGHT: 62 IN

## 2022-01-27 DIAGNOSIS — F51.01 PRIMARY INSOMNIA: ICD-10-CM

## 2022-01-27 DIAGNOSIS — F41.9 ANXIETY: ICD-10-CM

## 2022-01-27 DIAGNOSIS — F32.4 MAJOR DEPRESSIVE DISORDER IN PARTIAL REMISSION, UNSPECIFIED WHETHER RECURRENT (HCC): Primary | ICD-10-CM

## 2022-01-27 PROCEDURE — 1036F TOBACCO NON-USER: CPT | Performed by: FAMILY MEDICINE

## 2022-01-27 PROCEDURE — 99213 OFFICE O/P EST LOW 20 MIN: CPT | Performed by: FAMILY MEDICINE

## 2022-01-27 PROCEDURE — 3725F SCREEN DEPRESSION PERFORMED: CPT | Performed by: FAMILY MEDICINE

## 2022-01-27 PROCEDURE — 3008F BODY MASS INDEX DOCD: CPT | Performed by: FAMILY MEDICINE

## 2022-01-27 NOTE — PROGRESS NOTES
FAMILY PRACTICE OFFICE VISIT       NAME: Shavonne Hunter  AGE: 61 y o  SEX: female       : 1965        MRN: [id number]    DATE: 2022  TIME: 5:58 PM    Assessment and Plan   1  Major depressive disorder in partial remission, unspecified whether recurrent ([facility] [address] )  Comments:  Holly is stable on exam; disc at length, & will start her on Sertraline QAM  Disc potential R/SE of the medication  Continue her counseling  Orders:  -     sertraline (Zoloft) 50 mg tablet; Take 1 tablet (50 mg total) by mouth daily    2  Anxiety  Comments:  As above  Orders:  -     sertraline (Zoloft) 50 mg tablet; Take 1 tablet (50 mg total) by mouth daily    3  Primary insomnia  Comments:  Pt has PRN Lunesta at home - helps when she takes  [place] PDMP was [place]  There are no Patient Instructions on file for this visit  Chief Complaint     Chief Complaint   Patient presents with    Follow-up     patient being seen for 6 month follow up        History of Present Illness   Shavonne Hunter is a 61y o -year-old female who is struggling still with depression, still not sleeping  Previously order mammogram and FBW have not been done  When she has taken the [facility] previously ordered, it has helped her  Pt lost her  in , financial / home stresses, work stresses  [place]MP was [place]  Still doing Grief Counseling  Review of Systems   Review of Systems   Constitutional: Negative for activity change  Psychiatric/Behavioral: Positive for dysphoric mood and sleep disturbance  Negative for suicidal ideas  No HI/SI         Active Problem List     Patient Active Problem List   Diagnosis    Hyperlipidemia    Legal blindness [place]    Anxiety    Obesity         Past Medical History:  Past Medical History:   Diagnosis Date    Lump of left breast     Obesity     Skin lesion        Past Surgical History:  Past Surgical History:   Procedure Laterality Date    CHOLECYSTECTOMY         Family History:  Family History   Problem Relation Age of Onset    Heart disease Mother         cardiac disorder     Coronary artery disease Family        Social History:  Social History     Socioeconomic History    Marital status: /Civil Union     Spouse name: Not on file    Number of children: Not on file    Years of education: Not on file    Highest education level: Not on file   Occupational History    Not on file   Tobacco Use    Smoking status: Never Smoker    Smokeless tobacco: Never Used   Substance and Sexual Activity    Alcohol use: Yes     Comment: social drinker per allscripts     Drug use: No    Sexual activity: Not on file   Other Topics Concern    Not on file   Social History Narrative    Not on file     Social Determinants of Health     Financial Resource Strain: Not on file   Food Insecurity: Not on file   Transportation Needs: Not on file   Physical Activity: Not on file   Stress: Not on file   Social Connections: Not on file   Intimate Partner Violence: Not on file   Housing Stability: Not on file       Objective     Vitals:    01/27/22 1342   BP: 120/82   Pulse: 81   Resp: 12   Temp: 99 7 °F (37 6 °C)   SpO2: 97%     Wt Readings from Last 3 Encounters:   01/27/22 94 4 kg (208 lb 3 2 oz)   07/27/21 93 5 kg (206 lb 3 2 oz)   06/15/20 93 kg (205 lb)       Physical Exam  Vitals and nursing note reviewed  Constitutional:       General: She is not in acute distress  Appearance: Normal appearance  She is not ill-appearing, toxic-appearing or diaphoretic  HENT:      Head: Normocephalic and atraumatic  Eyes:      General: No scleral icterus  Conjunctiva/sclera: Conjunctivae normal    Neurological:      Mental Status: She is alert and oriented to person, place, and time  Psychiatric:         Mood and Affect: Mood is depressed  Affect is tearful  Speech: Speech normal          Behavior: Behavior normal          Thought Content:  Thought content normal          Judgment: Judgment normal          Pertinent Laboratory/Diagnostic Studies:  Lab Results   Component Value Date    GLUCOSE 90 03/10/2017    BUN 15 06/09/2017    CREATININE 0 71 06/09/2017    CALCIUM 8 7 06/09/2017    K 4 0 06/09/2017    CO2 28 06/09/2017     06/09/2017     Lab Results   Component Value Date    ALT 27 06/09/2017    AST 14 06/09/2017    ALKPHOS 89 06/09/2017       Lab Results   Component Value Date    WBC 12 90 (H) 06/09/2017    HGB 13 5 06/09/2017    HCT 41 8 06/09/2017    MCV 95 06/09/2017     06/09/2017       No results found for: TSH    Lab Results   Component Value Date    CHOL 161 03/10/2017     Lab Results   Component Value Date    TRIG 95 03/10/2017     Lab Results   Component Value Date    HDL 49 03/10/2017     Lab Results   Component Value Date    LDLCALC 93 03/10/2017     No results found for: HGBA1C    Results for orders placed or performed during the hospital encounter of 06/08/17   Blood culture    Specimen: Arm, Left; Blood   Result Value Ref Range    Blood Culture No Growth After 5 Days  Blood culture    Specimen: Arm, Right; Blood   Result Value Ref Range    Blood Culture No Growth After 5 Days      Rapid Beta strep screen    Specimen: Throat   Result Value Ref Range    Rapid Strep A Screen Negative Negative   Throat culture    Specimen: Throat   Result Value Ref Range    Throat Culture Negative for beta-hemolytic Streptococcus    CBC and differential   Result Value Ref Range    WBC 12 90 (H) 4 31 - 10 16 Thousand/uL    RBC 4 42 3 81 - 5 12 Million/uL    Hemoglobin 13 5 11 5 - 15 4 g/dL    Hematocrit 41 8 34 8 - 46 1 %    MCV 95 82 - 98 fL    MCH 30 5 26 8 - 34 3 pg    MCHC 32 3 31 4 - 37 4 g/dL    RDW 13 3 11 6 - 15 1 %    MPV 9 3 8 9 - 12 7 fL    Platelets 134 132 - 792 Thousands/uL    Neutrophils Relative 80 (H) 43 - 75 %    Lymphocytes Relative 10 (L) 14 - 44 %    Monocytes Relative 8 4 - 12 %    Eosinophils Relative 2 0 - 6 %    Basophils Relative 0 0 - 1 %    Neutrophils Absolute 10 22 (H) 1 85 - 7 62 Thousands/µL    Lymphocytes Absolute 1 34 0 60 - 4 47 Thousands/µL    Monocytes Absolute 0 99 0 17 - 1 22 Thousand/µL    Eosinophils Absolute 0 30 0 00 - 0 61 Thousand/µL    Basophils Absolute 0 05 0 00 - 0 10 Thousands/µL   Comprehensive metabolic panel   Result Value Ref Range    Sodium 139 136 - 145 mmol/L    Potassium 4 0 3 5 - 5 3 mmol/L    Chloride 102 100 - 108 mmol/L    CO2 28 21 - 32 mmol/L    ANION GAP 9 4 - 13 mmol/L    BUN 15 5 - 25 mg/dL    Creatinine 0 71 0 60 - 1 30 mg/dL    Glucose 108 65 - 140 mg/dL    Calcium 8 7 8 3 - 10 1 mg/dL    AST 14 5 - 45 U/L    ALT 27 12 - 78 U/L    Alkaline Phosphatase 89 46 - 116 U/L    Total Protein 7 7 6 4 - 8 2 g/dL    Albumin 3 6 3 5 - 5 0 g/dL    Total Bilirubin 0 40 0 20 - 1 00 mg/dL    eGFR >60 0 ml/min/1 73sq m   Lactic acid, plasma   Result Value Ref Range    LACTIC ACID 0 6 0 5 - 2 0 mmol/L   Quantitative hCG   Result Value Ref Range    HCG, Quant <2 <=6 mIU/mL       No orders of the defined types were placed in this encounter  ALLERGIES:  No Known Allergies    Current Medications     Current Outpatient Medications   Medication Sig Dispense Refill    ALPRAZolam (XANAX) 0 5 mg tablet Take 1 tablet (0 5 mg total) by mouth 2 (two) times a day as needed for anxiety 40 tablet 2    eszopiclone (LUNESTA) 1 mg tablet Take 1 tablet (1 mg total) by mouth daily at bedtime as needed for sleep Take immediately before bedtime 30 tablet 1    sertraline (Zoloft) 50 mg tablet Take 1 tablet (50 mg total) by mouth daily 30 tablet 3     No current facility-administered medications for this visit           Health Maintenance     Health Maintenance   Topic Date Due    Hepatitis C Screening  Never done    HIV Screening  Never done    DTaP,Tdap,and Td Vaccines (1 - Tdap) Never done    Cervical Cancer Screening  Never done    Colorectal Cancer Screening  Never done    Breast Cancer Screening: Mammogram  04/28/2018    Influenza Vaccine (1) Never done    COVID-19 Vaccine (3 - Booster for Neri Arash series) 11/06/2021    BMI: Followup Plan  07/27/2022    Annual Physical  07/27/2022    Depression Screening  01/27/2023    BMI: Adult  01/27/2023    Pneumococcal Vaccine: Pediatrics (0 to 5 Years) and At-Risk Patients (6 to 59 Years)  Aged Out    HIB Vaccine  Aged Out    Hepatitis B Vaccine  Aged Out    IPV Vaccine  Aged Out    Hepatitis A Vaccine  Aged Out    Meningococcal ACWY Vaccine  Aged Out    HPV Vaccine  Aged [employer] History   Administered Date(s) Administered    COVID-19 PFIZER VACCINE 0 3 ML IM 04/14/2021, 05/06/2021       Depression Screening and Follow-up Plan: Patient was screened for depression during today's encounter  They screened negative with a PHQ-2 score of 1          126 Kaya Rodriguez DO

## 2022-10-06 ENCOUNTER — OFFICE VISIT (OUTPATIENT)
Dept: FAMILY MEDICINE CLINIC | Facility: CLINIC | Age: 57
End: 2022-10-06
Payer: COMMERCIAL

## 2022-10-06 VITALS
HEART RATE: 75 BPM | WEIGHT: 202 LBS | HEIGHT: 62 IN | DIASTOLIC BLOOD PRESSURE: 82 MMHG | SYSTOLIC BLOOD PRESSURE: 126 MMHG | BODY MASS INDEX: 37.17 KG/M2 | OXYGEN SATURATION: 98 % | RESPIRATION RATE: 14 BRPM | TEMPERATURE: 97.6 F

## 2022-10-06 DIAGNOSIS — F51.01 PRIMARY INSOMNIA: ICD-10-CM

## 2022-10-06 DIAGNOSIS — F33.41 RECURRENT MAJOR DEPRESSIVE DISORDER, IN PARTIAL REMISSION (HCC): ICD-10-CM

## 2022-10-06 DIAGNOSIS — Z12.31 ENCOUNTER FOR SCREENING MAMMOGRAM FOR BREAST CANCER: ICD-10-CM

## 2022-10-06 DIAGNOSIS — F41.9 ANXIETY: Primary | ICD-10-CM

## 2022-10-06 DIAGNOSIS — R60.9 DEPENDENT EDEMA: ICD-10-CM

## 2022-10-06 PROCEDURE — 99214 OFFICE O/P EST MOD 30 MIN: CPT | Performed by: FAMILY MEDICINE

## 2022-10-06 RX ORDER — FUROSEMIDE 20 MG/1
20 TABLET ORAL DAILY
Qty: 30 TABLET | Refills: 3 | Status: SHIPPED | OUTPATIENT
Start: 2022-10-06

## 2022-10-06 NOTE — PROGRESS NOTES
FAMILY PRACTICE OFFICE VISIT       NAME: Tad Gtz  AGE: 61 y o  SEX: female       : 1965        MRN: [id number]    DATE: 10/7/2022  TIME: 5:25 PM    Assessment and Plan   1  Anxiety  Comments:  Pt stable - hx of, bereavement  Holly is doing much better at this time - off all medication  2  Recurrent major depressive disorder, in partial remission ([facility] [address] )  Comments:  As above  3  Primary insomnia  Comments:  As above  4  Encounter for screening mammogram for breast cancer  Comments:  Mammogram ordered  Orders:  -     Mammo screening bilateral w 3d & cad; Future; Expected date: 10/06/2022    5  Dependent edema  Comments: Will place Holly on 4 days of Furosemide QD, taking in some OJ/banana/greens (potassium)  Lessen salt intake, elevate legs, OTC compression socks  Orders:  -     furosemide (LASIX) 20 mg tablet; Take 1 tablet (20 mg total) by mouth daily  -     Basic metabolic panel; Future         There are no Patient Instructions on file for this visit  Chief Complaint     Chief Complaint   Patient presents with    Follow-up     Patient being seen for 2 month follow up     Cramp     Patient being seen for left leg cramp    Edema     Patient being seen for bilateral ankle edema        History of Present Illness   Tad Gtz is a 61y o -year-old female who presents in f/u today -> we last saw Abiola Merrill in 2022, and wanted her to f/u in 2 months; we have not seen her since then - pt had job issues, etc   Pt had labs done for work -> will get us a copy  Pt declines referral to OB/Gynecology for PAP smear at this time  She did try to make an appt for a mammogram   Edema in both legs, some cramping in the left calf  Mood overall is much better (lost her  in 2021); good supportive friend network - sleeping better  Review of Systems   Review of Systems   Constitutional: Negative for activity change  Respiratory: Negative for shortness of breath  Cardiovascular: Positive for leg swelling  Negative for chest pain  Musculoskeletal: Positive for myalgias  Psychiatric/Behavioral: Negative for dysphoric mood and sleep disturbance  The patient is not nervous/anxious          Active Problem List     Patient Active Problem List   Diagnosis    Hyperlipidemia    Legal blindness [place]    Anxiety    Obesity         Past Medical History:  Past Medical History:   Diagnosis Date    Lump of left breast     Obesity     Skin lesion        Past Surgical History:  Past Surgical History:   Procedure Laterality Date    CHOLECYSTECTOMY         Family History:  Family History   Problem Relation Age of Onset    Heart disease Mother         cardiac disorder     Coronary artery disease Family        Social History:  Social History     Socioeconomic History    Marital status: /Civil Union     Spouse name: Not on file    Number of children: Not on file    Years of education: Not on file    Highest education level: Not on file   Occupational History    Not on file   Tobacco Use    Smoking status: Never Smoker    Smokeless tobacco: Never Used   Vaping Use    Vaping Use: Never used   Substance and Sexual Activity    Alcohol use: Yes     Comment: social drinker per allscripts     Drug use: No    Sexual activity: Not Currently   Other Topics Concern    Not on file   Social History Narrative    Not on file     Social Determinants of Health     Financial Resource Strain: Not on file   Food Insecurity: Not on file   Transportation Needs: Not on file   Physical Activity: Not on file   Stress: Not on file   Social Connections: Not on file   Intimate Partner Violence: Not on file   Housing Stability: Not on file       Objective     Vitals:    10/06/22 0846   BP: 126/82   Pulse: 75   Resp: 14   Temp: 97 6 °F (36 4 °C)   SpO2: 98%     Wt Readings from Last 3 Encounters:   10/06/22 91 6 kg (202 lb)   01/27/22 94 4 kg (208 lb 3 2 oz)   07/27/21 93 5 kg (206 lb 3 2 oz) Physical Exam  Vitals and nursing note reviewed  Constitutional:       General: She is not in acute distress  Appearance: Normal appearance  She is not ill-appearing, toxic-appearing or diaphoretic  HENT:      Head: Normocephalic and atraumatic  Eyes:      General: No scleral icterus  Conjunctiva/sclera: Conjunctivae normal    Neck:      Vascular: No JVD  Cardiovascular:      Rate and Rhythm: Normal rate and regular rhythm  Heart sounds: Normal heart sounds  No murmur heard  No friction rub  No gallop  Pulmonary:      Effort: Pulmonary effort is normal  No respiratory distress  Breath sounds: Normal breath sounds  No stridor  No wheezing, rhonchi or rales  Musculoskeletal:      Cervical back: Normal range of motion and neck supple  No rigidity or tenderness  Legs:    Lymphadenopathy:      Cervical: No cervical adenopathy  Neurological:      Mental Status: She is alert and oriented to person, place, and time  Psychiatric:         Mood and Affect: Mood normal          Behavior: Behavior normal          Thought Content:  Thought content normal          Judgment: Judgment normal          Pertinent Laboratory/Diagnostic Studies:  Lab Results   Component Value Date    GLUCOSE 90 03/10/2017    BUN 15 06/09/2017    CREATININE 0 71 06/09/2017    CALCIUM 8 7 06/09/2017    K 4 0 06/09/2017    CO2 28 06/09/2017     06/09/2017     Lab Results   Component Value Date    ALT 27 06/09/2017    AST 14 06/09/2017    ALKPHOS 89 06/09/2017       Lab Results   Component Value Date    WBC 12 90 (H) 06/09/2017    HGB 13 5 06/09/2017    HCT 41 8 06/09/2017    MCV 95 06/09/2017     06/09/2017       No results found for: TSH    Lab Results   Component Value Date    CHOL 161 03/10/2017     Lab Results   Component Value Date    TRIG 95 03/10/2017     Lab Results   Component Value Date    HDL 49 03/10/2017     Lab Results   Component Value Date    LDLCALC 93 03/10/2017     No results found for: HGBA1C    Results for orders placed or performed during the hospital encounter of 06/08/17   Blood culture    Specimen: Arm, Left; Blood   Result Value Ref Range    Blood Culture No Growth After 5 Days  Blood culture    Specimen: Arm, Right; Blood   Result Value Ref Range    Blood Culture No Growth After 5 Days      Rapid Beta strep screen    Specimen: Throat   Result Value Ref Range    Rapid Strep A Screen Negative Negative   Throat culture    Specimen: Throat   Result Value Ref Range    Throat Culture Negative for beta-hemolytic Streptococcus    CBC and differential   Result Value Ref Range    WBC 12 90 (H) 4 31 - 10 16 Thousand/uL    RBC 4 42 3 81 - 5 12 Million/uL    Hemoglobin 13 5 11 5 - 15 4 g/dL    Hematocrit 41 8 34 8 - 46 1 %    MCV 95 82 - 98 fL    MCH 30 5 26 8 - 34 3 pg    MCHC 32 3 31 4 - 37 4 g/dL    RDW 13 3 11 6 - 15 1 %    MPV 9 3 8 9 - 12 7 fL    Platelets 412 194 - 051 Thousands/uL    Neutrophils Relative 80 (H) 43 - 75 %    Lymphocytes Relative 10 (L) 14 - 44 %    Monocytes Relative 8 4 - 12 %    Eosinophils Relative 2 0 - 6 %    Basophils Relative 0 0 - 1 %    Neutrophils Absolute 10 22 (H) 1 85 - 7 62 Thousands/µL    Lymphocytes Absolute 1 34 0 60 - 4 47 Thousands/µL    Monocytes Absolute 0 99 0 17 - 1 22 Thousand/µL    Eosinophils Absolute 0 30 0 00 - 0 61 Thousand/µL    Basophils Absolute 0 05 0 00 - 0 10 Thousands/µL   Comprehensive metabolic panel   Result Value Ref Range    Sodium 139 136 - 145 mmol/L    Potassium 4 0 3 5 - 5 3 mmol/L    Chloride 102 100 - 108 mmol/L    CO2 28 21 - 32 mmol/L    ANION GAP 9 4 - 13 mmol/L    BUN 15 5 - 25 mg/dL    Creatinine 0 71 0 60 - 1 30 mg/dL    Glucose 108 65 - 140 mg/dL    Calcium 8 7 8 3 - 10 1 mg/dL    AST 14 5 - 45 U/L    ALT 27 12 - 78 U/L    Alkaline Phosphatase 89 46 - 116 U/L    Total Protein 7 7 6 4 - 8 2 g/dL    Albumin 3 6 3 5 - 5 0 g/dL    Total Bilirubin 0 40 0 20 - 1 00 mg/dL    eGFR >60 0 ml/min/1 73sq m   Lactic acid, plasma   Result Value Ref Range    LACTIC ACID 0 6 0 5 - 2 0 mmol/L   Quantitative hCG   Result Value Ref Range    HCG, Quant <2 <=6 mIU/mL       Orders Placed This Encounter   Procedures    Mammo screening bilateral w 3d & cad    Basic metabolic panel       ALLERGIES:  No Known Allergies    Current Medications     Current Outpatient Medications   Medication Sig Dispense Refill    ALPRAZolam (XANAX) 0 5 mg tablet Take 1 tablet (0 5 mg total) by mouth 2 (two) times a day as needed for anxiety 40 tablet 2    eszopiclone (LUNESTA) 1 mg tablet Take 1 tablet (1 mg total) by mouth daily at bedtime as needed for sleep Take immediately before bedtime 30 tablet 1    furosemide (LASIX) 20 mg tablet Take 1 tablet (20 mg total) by mouth daily 30 tablet 3    sertraline (Zoloft) 50 mg tablet Take 1 tablet (50 mg total) by mouth daily (Patient not taking: Reported on 10/6/2022) 30 tablet 3     No current facility-administered medications for this visit           Health Maintenance     Health Maintenance   Topic Date Due    Hepatitis C Screening  Never done    HIV Screening  Never done    DTaP,Tdap,and Td Vaccines (1 - Tdap) Never done    Cervical Cancer Screening  Never done    Colorectal Cancer Screening  Never done    Breast Cancer Screening: Mammogram  04/28/2018    COVID-19 Vaccine (3 - Booster for Pf[employer] series) 10/06/2021    BMI: Followup Plan  07/27/2022    Annual Physical  07/27/2022    Influenza Vaccine (1) Never done    Depression Screening  10/06/2023    BMI: Adult  10/06/2023    Pneumococcal Vaccine: Pediatrics (0 to 5 Years) and At-Risk Patients (6 to 59 Years)  Aged Out    HIB Vaccine  Aged Out    Hepatitis B Vaccine  Aged Out    IPV Vaccine  Aged Out    Hepatitis A Vaccine  Aged Out    Meningococcal ACWY Vaccine  Aged Out    HPV Vaccine  Aged [employer] History   Administered Date(s) Administered    COVID-19 PFIZER VACCINE 0 3 ML IM 04/14/2021, 05/06/2021       Depression Screening and Follow-up Plan: Patient was screened for depression during today's encounter  They screened negative with a PHQ-2 score of 0          Leandrew Abt

## 2022-11-16 ENCOUNTER — TELEPHONE (OUTPATIENT)
Dept: FAMILY MEDICINE CLINIC | Facility: CLINIC | Age: 57
End: 2022-11-16

## 2022-11-16 DIAGNOSIS — R60.9 DEPENDENT EDEMA: ICD-10-CM

## 2022-11-16 RX ORDER — FUROSEMIDE 20 MG/1
TABLET ORAL
Qty: 90 TABLET | Refills: 0 | Status: SHIPPED | OUTPATIENT
Start: 2022-11-16

## 2023-02-27 DIAGNOSIS — F41.9 ANXIETY: ICD-10-CM

## 2023-02-27 DIAGNOSIS — F32.4 MAJOR DEPRESSIVE DISORDER IN PARTIAL REMISSION, UNSPECIFIED WHETHER RECURRENT (HCC): ICD-10-CM

## 2023-03-29 ENCOUNTER — OFFICE VISIT (OUTPATIENT)
Dept: FAMILY MEDICINE CLINIC | Facility: CLINIC | Age: 58
End: 2023-03-29

## 2023-03-29 VITALS
DIASTOLIC BLOOD PRESSURE: 78 MMHG | OXYGEN SATURATION: 99 % | BODY MASS INDEX: 36.55 KG/M2 | SYSTOLIC BLOOD PRESSURE: 120 MMHG | HEIGHT: 62 IN | WEIGHT: 198.6 LBS | TEMPERATURE: 97 F | HEART RATE: 74 BPM | RESPIRATION RATE: 14 BRPM

## 2023-03-29 DIAGNOSIS — R53.83 OTHER FATIGUE: ICD-10-CM

## 2023-03-29 DIAGNOSIS — R60.9 DEPENDENT EDEMA: ICD-10-CM

## 2023-03-29 DIAGNOSIS — Z12.11 SCREENING FOR COLON CANCER: ICD-10-CM

## 2023-03-29 DIAGNOSIS — F32.4 MAJOR DEPRESSIVE DISORDER IN PARTIAL REMISSION, UNSPECIFIED WHETHER RECURRENT (HCC): ICD-10-CM

## 2023-03-29 DIAGNOSIS — Z00.00 ANNUAL PHYSICAL EXAM: Primary | ICD-10-CM

## 2023-03-29 DIAGNOSIS — Z13.1 SCREENING FOR DIABETES MELLITUS: ICD-10-CM

## 2023-03-29 DIAGNOSIS — F41.9 ANXIETY: ICD-10-CM

## 2023-03-29 DIAGNOSIS — Z13.6 SCREENING FOR CARDIOVASCULAR CONDITION: ICD-10-CM

## 2023-03-29 RX ORDER — FUROSEMIDE 20 MG/1
20 TABLET ORAL DAILY
Qty: 90 TABLET | Refills: 1 | Status: SHIPPED | OUTPATIENT
Start: 2023-03-29

## 2023-03-29 NOTE — PROGRESS NOTES
[address]    NAME: Drake Yan  AGE: 61 y o  SEX: female  : 1965     DATE: 3/29/2023     Assessment and Plan:     Problem List Items Addressed This Visit        Other    Anxiety    Relevant Medications    sertraline (ZOLOFT) 50 mg tablet   Other Visit Diagnoses     Annual physical exam    -  Primary    Holly is stable on exam   She is to continue a lower carb/sal diet, regular  FBW ordered  Pt declines mammogram at this time  Screening for diabetes mellitus        Relevant Orders    Comprehensive metabolic panel    Screening for cardiovascular condition        Relevant Orders    Lipid Panel with Direct LDL reflex    Other fatigue        Relevant Orders    CBC and differential    TSH, 3rd generation with Free T4 reflex    Screening for colon cancer        Declines all colon cancer screening at this time  BMI 35 0-35 9,adult        Diet and exercise as above  Dependent edema        Furosemide QD ordered, taking in some OJ/banana/greens (potassium)  Lessen salt intake, elevate legs, etc     Relevant Medications    furosemide (LASIX) 20 mg tablet    Major depressive disorder in partial remission, unspecified whether recurrent (HCC)        Stable on Sertraline QAM      Relevant Medications    sertraline (ZOLOFT) 50 mg tablet          Immunizations and preventive care screenings were discussed with patient today  Appropriate education was printed on patient's after visit summary  Counseling:  Dental Health: discussed importance of regular tooth brushing, flossing, and dental visits  · Exercise: the importance of regular exercise/physical activity was discussed  Recommend exercise 3-5 times per week for at least 30 minutes  BMI Counseling: Body mass index is 36 27 kg/m²  The BMI is above normal  Nutrition recommendations include moderation in carbohydrate intake   Exercise recommendations include exercising 3-5 times per week  No pharmacotherapy was ordered  Patient referred to PCP  Rationale for BMI follow-up plan is due to patient being overweight or obese  Depression Screening and Follow-up Plan: Patient was screened for depression during today's encounter  They screened negative with a PHQ-2 score of 0  Return in 6 months (on 9/29/2023) for Recheck  Chief Complaint:     Chief Complaint   Patient presents with   • Physical Exam     Patient being seen for physical       History of Present Illness:     Adult Annual Physical   Patient here for a comprehensive physical exam  The patient reports no problems  Ingris Alexandra has gone through a rough time - she lost her job in 121/2022  She is legally blind, and she cannot drive - limiting her options  She is applying for disability  The Furosemide really helps her dependent edema  Diet and Physical Activity  · Diet/Nutrition: well balanced diet  · Exercise: 3-4 times a week on average  Depression Screening  PHQ-2/9 Depression Screening    Little interest or pleasure in doing things: 0 - not at all  Feeling down, depressed, or hopeless: 0 - not at all  PHQ-2 Score: 0  PHQ-2 Interpretation: Negative depression screen       General Health  · Sleep: sleeps well  · Hearing: normal - bilateral   · Vision: vision problems: Pt is chronically, legally blind - cannot drive      · Dental: no dental visits for >1 year  Discussed  /GYN Health  · Patient is: Perimenopausal   Pt declines PAP smear right now  · Last menstrual period: N/A   · Contraceptive method: None  Review of Systems:     Review of Systems   Constitutional: Negative for activity change  Respiratory: Negative for shortness of breath  Cardiovascular: Negative for chest pain and leg swelling  Gastrointestinal: Negative for abdominal pain and blood in stool  Genitourinary: Negative for menstrual problem  No new breast lumps on self-examination     Psychiatric/Behavioral: Negative for dysphoric mood  The patient is not nervous/anxious  Mood stable on Sertraline        Past Medical History:     Past Medical History:   Diagnosis Date   • Lump of left breast    • Obesity    • Skin lesion       Past Surgical History:     Past Surgical History:   Procedure Laterality Date   • CHOLECYSTECTOMY        Social History:     Social History     Socioeconomic History   • Marital status: /Civil Union     Spouse name: None   • Number of children: None   • Years of education: None   • Highest education level: None   Occupational History   • None   Tobacco Use   • Smoking status: Never   • Smokeless tobacco: Never   Vaping Use   • Vaping Use: Never used   Substance and Sexual Activity   • Alcohol use: Yes     Comment: social drinker per allscripts    • Drug use: No   • Sexual activity: Not Currently   Other Topics Concern   • None   Social History Narrative   • None     Social Determinants of Health     Financial Resource Strain: Not on file   Food Insecurity: Not on file   Transportation Needs: Not on file   Physical Activity: Not on file   Stress: Not on file   Social Connections: Not on file   Intimate Partner Violence: Not on file   Housing Stability: Not on file      Family History:     Family History   Problem Relation Age of Onset   • Heart disease Mother         cardiac disorder    • Coronary artery disease Family       Current Medications:     Current Outpatient Medications   Medication Sig Dispense Refill   • furosemide (LASIX) 20 mg tablet Take 1 tablet (20 mg total) by mouth daily 90 tablet 1   • sertraline (ZOLOFT) 50 mg tablet Take 1 tablet (50 mg total) by mouth daily 90 tablet 1   • ALPRAZolam (XANAX) 0 5 mg tablet Take 1 tablet (0 5 mg total) by mouth 2 (two) times a day as needed for anxiety (Patient not taking: Reported on 3/29/2023) 40 tablet 2   • eszopiclone (LUNESTA) 1 mg tablet TAKE 1 TABLET(1 MG) BY MOUTH DAILY AT BEDTIME AS NEEDED FOR SLEEP( TAKE IMMEDIATELY "BEFORE BEDTIME) (Patient not taking: Reported on 3/29/2023) 30 tablet 3     No current facility-administered medications for this visit  Allergies:     No Known Allergies   Physical Exam:     /78 (BP Location: Left arm, Patient Position: Sitting, Cuff Size: Large)   Pulse 74   Temp (!) 97 °F (36 1 °C) (Tympanic)   Resp 14   Ht 5' 2 05\" (1 576 m)   Wt 90 1 kg (198 lb 9 6 oz)   SpO2 99%   BMI 36 27 kg/m²     Physical Exam  Vitals and nursing note reviewed  Constitutional:       General: She is not in acute distress  Appearance: Normal appearance  She is not ill-appearing, toxic-appearing or diaphoretic  HENT:      Head: Normocephalic and atraumatic  Right Ear: Tympanic membrane, ear canal and external ear normal       Left Ear: Tympanic membrane, ear canal and external ear normal       Mouth/Throat:      Mouth: Mucous membranes are moist       Pharynx: Oropharynx is clear  No oropharyngeal exudate or posterior oropharyngeal erythema  Eyes:      General: No scleral icterus  Conjunctiva/sclera: Conjunctivae normal       Comments: 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  Cardiovascular:      Rate and Rhythm: Normal rate and regular rhythm  Heart sounds: Normal heart sounds  No murmur heard  No friction rub  No gallop  Pulmonary:      Effort: Pulmonary effort is normal  No respiratory distress  Breath sounds: Normal breath sounds  No stridor  No wheezing, rhonchi or rales  Abdominal:      General: Abdomen is flat  Bowel sounds are normal  There is no distension  Palpations: Abdomen is soft  There is no mass  Tenderness: There is no abdominal tenderness  There is no guarding or rebound  Musculoskeletal:      Cervical back: Normal range of motion and neck supple  No rigidity or tenderness  Lymphadenopathy:      Cervical: No cervical adenopathy  Neurological:      Mental Status: She is alert and oriented to person, place, and time     Psychiatric:       " Mood and Affect: Mood normal          Behavior: Behavior normal          Thought Content: Thought content normal          Judgment: Judgment normal           Holly was seen today for physical exam     Diagnoses and all orders for this visit:    Annual physical exam  Comments:  Aline Mandujano is stable on exam   She is to continue a lower carb/sal diet, regular  FBW ordered  Pt declines mammogram at this time  Screening for diabetes mellitus  -     Comprehensive metabolic panel; Future    Screening for cardiovascular condition  -     Lipid Panel with Direct LDL reflex; Future    Other fatigue  -     CBC and differential; Future  -     TSH, 3rd generation with Free T4 reflex; Future    Screening for colon cancer  Comments:  Declines all colon cancer screening at this time  BMI 35 0-35 9,adult  Comments:  Diet and exercise as above  Dependent edema  Comments:  Furosemide QD ordered, taking in some OJ/banana/greens (potassium)  Lessen salt intake, elevate legs, etc   Orders:  -     furosemide (LASIX) 20 mg tablet; Take 1 tablet (20 mg total) by mouth daily    Major depressive disorder in partial remission, unspecified whether recurrent (HCC)  Comments:  Stable on Sertraline QAM    Orders:  -     sertraline (ZOLOFT) 50 mg tablet; Take 1 tablet (50 mg total) by mouth daily    Anxiety  Comments:  As above  Orders:  -     sertraline (ZOLOFT) 50 mg tablet;  Take 1 tablet (50 mg total) by mouth daily          Roger Rebollar, DO  [id number] [facility]

## 2024-05-02 ENCOUNTER — OFFICE VISIT (OUTPATIENT)
Dept: FAMILY MEDICINE CLINIC | Facility: CLINIC | Age: 59
End: 2024-05-02
Payer: COMMERCIAL

## 2024-05-02 VITALS
DIASTOLIC BLOOD PRESSURE: 90 MMHG | SYSTOLIC BLOOD PRESSURE: 120 MMHG | BODY MASS INDEX: 36.55 KG/M2 | TEMPERATURE: 99 F | WEIGHT: 198.6 LBS | OXYGEN SATURATION: 97 % | HEART RATE: 74 BPM | RESPIRATION RATE: 16 BRPM | HEIGHT: 62 IN

## 2024-05-02 DIAGNOSIS — Z12.11 COLON CANCER SCREENING: ICD-10-CM

## 2024-05-02 DIAGNOSIS — H54.8 LEGAL BLINDNESS USA: ICD-10-CM

## 2024-05-02 DIAGNOSIS — Z00.00 ANNUAL PHYSICAL EXAM: ICD-10-CM

## 2024-05-02 DIAGNOSIS — E78.00 PURE HYPERCHOLESTEROLEMIA: Primary | ICD-10-CM

## 2024-05-02 DIAGNOSIS — Z13.1 SCREENING FOR DIABETES MELLITUS: ICD-10-CM

## 2024-05-02 DIAGNOSIS — Z13.220 ENCOUNTER FOR LIPID SCREENING FOR CARDIOVASCULAR DISEASE: ICD-10-CM

## 2024-05-02 DIAGNOSIS — R60.9 DEPENDENT EDEMA: ICD-10-CM

## 2024-05-02 DIAGNOSIS — Z12.4 CERVICAL CANCER SCREENING: ICD-10-CM

## 2024-05-02 DIAGNOSIS — Z12.31 SCREENING MAMMOGRAM FOR BREAST CANCER: ICD-10-CM

## 2024-05-02 DIAGNOSIS — Z13.6 ENCOUNTER FOR LIPID SCREENING FOR CARDIOVASCULAR DISEASE: ICD-10-CM

## 2024-05-02 PROCEDURE — 99396 PREV VISIT EST AGE 40-64: CPT | Performed by: FAMILY MEDICINE

## 2024-05-02 RX ORDER — FUROSEMIDE 20 MG/1
20 TABLET ORAL DAILY
Qty: 90 TABLET | Refills: 1 | Status: SHIPPED | OUTPATIENT
Start: 2024-05-02

## 2024-05-02 NOTE — PROGRESS NOTES
ADULT ANNUAL PHYSICAL  [facility]    NAME: Sherri Friedel  AGE: 58 y.o. SEX: female  : 1965     DATE: 2024     Assessment and Plan:     Problem List Items Addressed This Visit       Hyperlipidemia - Primary     Lab Results   Component Value Date    TRIG 95 03/10/2017    HDL 49 03/10/2017    LDLCALC 93 03/10/2017     Has not had repeat lipids in quite some time.  Continues off medications.  -Will recheck fasting blood work.  Continue dietary and lifestyle modifications         Legal blindness [place]    Dependent edema     - Stable.  Continues on Lasix daily.  Admits she is eating lots of fruits and greens.    -Will recheck CMP.  -Continue Lasix for now.         Relevant Medications    furosemide (LASIX) 20 mg tablet     Other Visit Diagnoses       Screening for diabetes mellitus        Relevant Orders    Comprehensive metabolic panel    Encounter for lipid screening for cardiovascular disease        Relevant Orders    Lipid Panel With Direct LDL    Colon cancer screening        Relevant Orders    Cologuard    Screening mammogram for breast cancer        Relevant Orders    Mammo screening bilateral w 3d & cad    Cervical cancer screening        Relevant Orders    Ambulatory Referral to Obstetrics / Gynecology    Annual physical exam                Immunizations and preventive care screenings were discussed with patient today. Appropriate education was printed on patient's after visit summary.    Counseling:  Alcohol/drug use: discussed moderation in alcohol intake, the recommendations for healthy alcohol use, and avoidance of illicit drug use.  Dental Health: discussed importance of regular tooth brushing, flossing, and dental visits.  Injury prevention: discussed safety/seat belts, safety helmets, smoke detectors, carbon dioxide detectors, and smoking near bedding or upholstery.  Sexual health: discussed sexually transmitted diseases, partner  selection, use of condoms, avoidance of unintended pregnancy, and contraceptive alternatives.  Exercise: the importance of regular exercise/physical activity was discussed. Recommend exercise 3-5 times per week for at least 30 minutes.          No follow-ups on file.     Chief Complaint:     Chief Complaint   Patient presents with    Medication Refill     Patient being seen for medication refill       History of Present Illness:     Adult Annual Physical   Patient here for a comprehensive physical exam. The patient reports no problems.    Diet and Physical Activity  Diet/Nutrition: well balanced diet and consuming 3-5 servings of fruits/vegetables daily.   Exercise: no formal exercise.      Depression Screening  PHQ-2/9 Depression Screening    Little interest or pleasure in doing things: 0 - not at all  Feeling down, depressed, or hopeless: 0 - not at all  PHQ-2 Score: 0  PHQ-2 Interpretation: Negative depression screen       General Health  Sleep: sleeps well and gets 7-8 hours of sleep on average.   Hearing: normal - bilateral.  Vision:  Legally blind .   Dental: regular dental visits and brushes teeth twice daily.       /GYN Health  Follows with gynecology? no   Patient is: postmenopausal      Advanced Care Planning  Do you have an advanced directive? no  Do you have a durable medical power of ? no  ACP document given to the patient? no     Review of Systems:     Review of Systems   Constitutional:  Negative for chills and fever.   HENT:  Negative for ear pain and sore throat.    Eyes:  Negative for pain and visual disturbance.   Respiratory:  Negative for cough and shortness of breath.    Cardiovascular:  Negative for chest pain and palpitations.   Gastrointestinal:  Negative for abdominal pain and vomiting.   Endocrine: Negative for polydipsia and polyuria.   Genitourinary:  Negative for dysuria and hematuria.   Musculoskeletal:  Negative for arthralgias and back pain.   Skin:  Negative for color  change and rash.   Neurological:  Negative for seizures, syncope, weakness, numbness and headaches.   Psychiatric/Behavioral:  Negative for confusion and sleep disturbance. The patient is not nervous/anxious.    All other systems reviewed and are negative.     Past Medical History:     Past Medical History:   Diagnosis Date    Lump of left breast     Obesity     Skin lesion       Past Surgical History:     Past Surgical History:   Procedure Laterality Date    CHOLECYSTECTOMY        Social History:     Social History     Socioeconomic History    Marital status: /Civil Union     Spouse name: None    Number of children: None    Years of education: None    Highest education level: None   Occupational History    None   Tobacco Use    Smoking status: Never    Smokeless tobacco: Never   Vaping Use    Vaping status: Never Used   Substance and Sexual Activity    Alcohol use: Yes     Comment: social drinker per allscripts     Drug use: No    Sexual activity: Not Currently   Other Topics Concern    None   Social History Narrative    None     Social Determinants of Health     Financial Resource Strain: Not on file   Food Insecurity: Not on file   Transportation Needs: Not on file   Physical Activity: Not on file   Stress: Not on file   Social Connections: Not on file   Intimate Partner Violence: Not on file   Housing Stability: Not on file      Family History:     Family History   Problem Relation Age of Onset    Heart disease Mother         cardiac disorder     Coronary artery disease Family       Current Medications:     Current Outpatient Medications   Medication Sig Dispense Refill    furosemide (LASIX) 20 mg tablet Take 1 tablet (20 mg total) by mouth daily 90 tablet 1     No current facility-administered medications for this visit.      Allergies:     No Known Allergies   Physical Exam:     /90 (BP Location: Left arm, Patient Position: Sitting, Cuff Size: Standard)   Pulse 74   Temp 99 °F (37.2 °C)  "(Tympanic)   Resp 16   Ht 5' 2.05\" (1.576 m)   Wt 90.1 kg (198 lb 9.6 oz)   SpO2 97%   BMI 36.27 kg/m²     Physical Exam  Vitals and nursing note reviewed.   Constitutional:       General: She is not in acute distress.     Appearance: Normal appearance.   HENT:      Head: Normocephalic and atraumatic.      Right Ear: Tympanic membrane and external ear normal.      Left Ear: Tympanic membrane and external ear normal.      Nose: Nose normal.      Mouth/Throat:      Mouth: Mucous membranes are moist.   Eyes:      Extraocular Movements: Extraocular movements intact.      Conjunctiva/sclera: Conjunctivae normal.      Pupils: Pupils are equal, round, and reactive to light.   Cardiovascular:      Rate and Rhythm: Normal rate and regular rhythm.      Pulses: Normal pulses.      Heart sounds: Normal heart sounds. No murmur heard.  Pulmonary:      Effort: Pulmonary effort is normal.      Breath sounds: Normal breath sounds. No wheezing, rhonchi or rales.   Abdominal:      General: Bowel sounds are normal.      Palpations: Abdomen is soft.      Tenderness: There is no abdominal tenderness. There is no guarding.   Musculoskeletal:         General: Normal range of motion.      Cervical back: Normal range of motion.      Right lower leg: Edema (Trace) present.      Left lower leg: Edema (Trace) present.   Lymphadenopathy:      Cervical: No cervical adenopathy.   Skin:     General: Skin is warm.      Capillary Refill: Capillary refill takes less than 2 seconds.   Neurological:      General: No focal deficit present.      Mental Status: She is alert and oriented to person, place, and time.   Psychiatric:         Mood and Affect: Mood normal.         Behavior: Behavior normal.          Duarte Swann,   [facility]    "

## 2024-05-07 PROBLEM — R60.9 DEPENDENT EDEMA: Status: ACTIVE | Noted: 2024-05-07

## 2024-05-07 NOTE — ASSESSMENT & PLAN NOTE
Lab Results   Component Value Date    TRIG 95 03/10/2017    HDL 49 03/10/2017    LDLCALC 93 03/10/2017     Has not had repeat lipids in quite some time.  Continues off medications.  -Will recheck fasting blood work.  Continue dietary and lifestyle modifications

## 2024-05-07 NOTE — ASSESSMENT & PLAN NOTE
- Stable.  Continues on Lasix daily.  Admits she is eating lots of fruits and greens.    -Will recheck CMP.  -Continue Lasix for now.

## 2024-05-08 ENCOUNTER — LAB (OUTPATIENT)
Dept: LAB | Facility: CLINIC | Age: 59
End: 2024-05-08
Payer: COMMERCIAL

## 2024-05-08 DIAGNOSIS — Z13.6 ENCOUNTER FOR LIPID SCREENING FOR CARDIOVASCULAR DISEASE: ICD-10-CM

## 2024-05-08 DIAGNOSIS — Z13.1 SCREENING FOR DIABETES MELLITUS: ICD-10-CM

## 2024-05-08 DIAGNOSIS — Z13.220 ENCOUNTER FOR LIPID SCREENING FOR CARDIOVASCULAR DISEASE: ICD-10-CM

## 2024-05-08 LAB
ALBUMIN SERPL BCP-MCNC: 4 G/DL (ref 3.5–5)
ALP SERPL-CCNC: 71 U/L (ref 34–104)
ALT SERPL W P-5'-P-CCNC: 27 U/L (ref 7–52)
ANION GAP SERPL CALCULATED.3IONS-SCNC: 6 MMOL/L (ref 4–13)
AST SERPL W P-5'-P-CCNC: 18 U/L (ref 13–39)
BILIRUB SERPL-MCNC: 0.44 MG/DL (ref 0.2–1)
BUN SERPL-MCNC: 16 MG/DL (ref 5–25)
CALCIUM SERPL-MCNC: 9.1 MG/DL (ref 8.4–10.2)
CHLORIDE SERPL-SCNC: 105 MMOL/L (ref 96–108)
CHOLEST SERPL-MCNC: 195 MG/DL
CO2 SERPL-SCNC: 27 MMOL/L (ref 21–32)
CREAT SERPL-MCNC: 0.66 MG/DL (ref 0.6–1.3)
GFR SERPL CREATININE-BSD FRML MDRD: 97 ML/MIN/1.73SQ M
GLUCOSE P FAST SERPL-MCNC: 90 MG/DL (ref 65–99)
HDLC SERPL-MCNC: 51 MG/DL
LDLC SERPL CALC-MCNC: 114 MG/DL (ref 0–100)
POTASSIUM SERPL-SCNC: 4.4 MMOL/L (ref 3.5–5.3)
PROT SERPL-MCNC: 7.1 G/DL (ref 6.4–8.4)
SODIUM SERPL-SCNC: 138 MMOL/L (ref 135–147)
TRIGL SERPL-MCNC: 152 MG/DL

## 2024-05-08 PROCEDURE — 80053 COMPREHEN METABOLIC PANEL: CPT

## 2024-05-08 PROCEDURE — 36415 COLL VENOUS BLD VENIPUNCTURE: CPT

## 2024-05-08 PROCEDURE — 80061 LIPID PANEL: CPT

## 2024-05-10 NOTE — RESULT ENCOUNTER NOTE
Estevan Barrera,    Your labs do come back.  Your metabolic panel looks great no concerning results.  Your cholesterol showed some mild elevation.  Please work on trying to lower any processed foods and carbohydrates.  We will plan to recheck in 6 months at her follow-up visit.  Please let me know if any questions.    Thanks  -Dr. GIFFORD

## 2024-05-22 LAB — COLOGUARD RESULT REPORTABLE: NORMAL

## 2024-08-23 ENCOUNTER — VBI (OUTPATIENT)
Dept: ADMINISTRATIVE | Facility: OTHER | Age: 59
End: 2024-08-23

## 2024-08-23 NOTE — TELEPHONE ENCOUNTER
08/23/24 10:44 AM     Chart reviewed for Pap Smear (HPV) aka Cervical Cancer Screening ; nothing is submitted to the patient's insurance at this time.     SELINA WILCOX MA   [facility]

## 2024-08-26 DIAGNOSIS — R60.9 DEPENDENT EDEMA: ICD-10-CM

## 2024-08-26 RX ORDER — FUROSEMIDE 20 MG
20 TABLET ORAL DAILY
Qty: 90 TABLET | Refills: 1 | Status: CANCELLED | OUTPATIENT
Start: 2024-08-26

## 2024-08-26 NOTE — TELEPHONE ENCOUNTER
furosemide (LASIX) 20 mg tablet [[id number]]    Patient stated medication was prescribed to her but she didn't pick it up and pharmacy put it back.  She needs medication resent or called in so she can pick it up today.      Thank you

## 2024-08-27 DIAGNOSIS — R60.9 DEPENDENT EDEMA: ICD-10-CM

## 2024-08-27 RX ORDER — FUROSEMIDE 20 MG
20 TABLET ORAL DAILY
Qty: 90 TABLET | Refills: 1 | Status: SHIPPED | OUTPATIENT
Start: 2024-08-27

## 2024-10-18 ENCOUNTER — OFFICE VISIT (OUTPATIENT)
Dept: FAMILY MEDICINE CLINIC | Facility: CLINIC | Age: 59
End: 2024-10-18
Payer: COMMERCIAL

## 2024-10-18 VITALS
DIASTOLIC BLOOD PRESSURE: 70 MMHG | BODY MASS INDEX: 37.69 KG/M2 | OXYGEN SATURATION: 98 % | HEART RATE: 75 BPM | TEMPERATURE: 97.3 F | RESPIRATION RATE: 16 BRPM | HEIGHT: 62 IN | WEIGHT: 204.8 LBS | SYSTOLIC BLOOD PRESSURE: 106 MMHG

## 2024-10-18 DIAGNOSIS — M25.561 ACUTE PAIN OF RIGHT KNEE: Primary | ICD-10-CM

## 2024-10-18 PROCEDURE — 99213 OFFICE O/P EST LOW 20 MIN: CPT | Performed by: FAMILY MEDICINE

## 2024-10-18 RX ORDER — DICLOFENAC SODIUM 75 MG/1
75 TABLET, DELAYED RELEASE ORAL 2 TIMES DAILY
Qty: 30 TABLET | Refills: 0 | Status: SHIPPED | OUTPATIENT
Start: 2024-10-18 | End: 2024-11-07 | Stop reason: SDUPTHER

## 2024-10-18 NOTE — ASSESSMENT & PLAN NOTE
- Started 2 weeks ago in the front of knee when standing up and walking. No pain with seated motion.   -

## 2024-10-18 NOTE — ASSESSMENT & PLAN NOTE
-Denies any trauma or falls.  Notes some flareup of her right-sided knee pain mostly on the medial side.  Does note she has been walking a lot more feels like this is the main cause.  -No effusion, edema noted.  Stable knee exam.  -Recommend home strengthening and exercise program  -Can take Voltaren tablets twice daily as needed as ordered to help with pain.  -Discussed formal physical therapy however patient would like to hold off for now.  -Follow-up as needed.  Consider imaging if no improvement.  Orders:    diclofenac (VOLTAREN) 75 mg EC tablet; Take 1 tablet (75 mg total) by mouth 2 (two) times a day

## 2024-10-18 NOTE — PROGRESS NOTES
Ambulatory Visit  Name: Sherri Friedel      : 1965      MRN: [id number]  Encounter Provider: Duarte Swann DO  Encounter Date: 10/18/2024   [facility]    Assessment & Plan  Acute pain of right knee  -Denies any trauma or falls.  Notes some flareup of her right-sided knee pain mostly on the medial side.  Does note she has been walking a lot more feels like this is the main cause.  -No effusion, edema noted.  Stable knee exam.  -Recommend home strengthening and exercise program  -Can take Voltaren tablets twice daily as needed as ordered to help with pain.  -Discussed formal physical therapy however patient would like to hold off for now.  -Follow-up as needed.  Consider imaging if no improvement.  Orders:    diclofenac (VOLTAREN) 75 mg EC tablet; Take 1 tablet (75 mg total) by mouth 2 (two) times a day         History of Present Illness     Jennifer is a 59-year-old female presents today for evaluation of right knee pain.  She denies any trauma to the knee.  She notes she has been walking a lot more and feels like this exacerbated some arthritis.  She has not been taking anything for it.  She continued to walk just has some pain.  Does note more stiff in the morning and feels better with use.  Has been using a compression sleeve on it with some relief.  Any fevers, chills, nausea, vomiting.  Denies any, clicking or catching sensations.  No other concerns.      Review of Systems   Constitutional:  Negative for chills and fever.   HENT:  Negative for ear pain and sore throat.    Eyes:  Negative for pain and visual disturbance.   Respiratory:  Negative for cough and shortness of breath.    Cardiovascular:  Negative for chest pain and palpitations.   Gastrointestinal:  Negative for abdominal pain and vomiting.   Genitourinary:  Negative for dysuria and hematuria.   Musculoskeletal:  Negative for arthralgias, back pain and myalgias.        Right knee pain   Skin:   "Negative for color change and rash.   Neurological:  Negative for seizures and syncope.   Psychiatric/Behavioral:  Negative for confusion and sleep disturbance. The patient is not nervous/anxious.    All other systems reviewed and are negative.    Past Medical History:   Diagnosis Date    Lump of left breast     Obesity     Skin lesion      Past Surgical History:   Procedure Laterality Date    CHOLECYSTECTOMY       Family History   Problem Relation Age of Onset    Heart disease Mother         cardiac disorder     Coronary artery disease Family      Social History     Tobacco Use    Smoking status: Never    Smokeless tobacco: Never   Vaping Use    Vaping status: Never Used   Substance and Sexual Activity    Alcohol use: Yes     Comment: social drinker per allscripts     Drug use: No    Sexual activity: Not Currently     Current Outpatient Medications on File Prior to Visit   Medication Sig    furosemide (LASIX) 20 mg tablet Take 1 tablet (20 mg total) by mouth daily     No Known Allergies  Immunization History   Administered Date(s) Administered    COVID-19 PFIZER VACCINE 0.3 ML IM 04/14/2021, 05/06/2021     Objective     /70 (BP Location: Left arm, Patient Position: Sitting, Cuff Size: Large)   Pulse 75   Temp (!) 97.3 °F (36.3 °C) (Tympanic)   Resp 16   Ht 5' 2.05\" (1.576 m)   Wt 92.9 kg (204 lb 12.8 oz)   SpO2 98%   BMI 37.40 kg/m²     Physical Exam  Vitals and nursing note reviewed.   Constitutional:       General: She is not in acute distress.     Appearance: Normal appearance.   HENT:      Head: Normocephalic and atraumatic.      Right Ear: Tympanic membrane and external ear normal.      Left Ear: Tympanic membrane and external ear normal.      Nose: Nose normal.      Mouth/Throat:      Mouth: Mucous membranes are moist.   Eyes:      Extraocular Movements: Extraocular movements intact.      Conjunctiva/sclera: Conjunctivae normal.      Pupils: Pupils are equal, round, and reactive to light. "   Cardiovascular:      Rate and Rhythm: Normal rate and regular rhythm.      Pulses: Normal pulses.      Heart sounds: Normal heart sounds. No murmur heard.  Pulmonary:      Effort: Pulmonary effort is normal.      Breath sounds: Normal breath sounds. No wheezing, rhonchi or rales.   Abdominal:      General: Bowel sounds are normal.      Palpations: Abdomen is soft.      Tenderness: There is no abdominal tenderness. There is no guarding.   Musculoskeletal:         General: No tenderness. Normal range of motion.      Cervical back: Normal range of motion.      Right lower leg: No edema.      Left lower leg: No edema.      Comments: Right knee: Full flexion and extension.  5 out of 5 strength testing.  Stable knee exam without  effusion or edema noted   Lymphadenopathy:      Cervical: No cervical adenopathy.   Skin:     General: Skin is warm.      Capillary Refill: Capillary refill takes less than 2 seconds.   Neurological:      General: No focal deficit present.      Mental Status: She is alert and oriented to person, place, and time.   Psychiatric:         Mood and Affect: Mood normal.         Behavior: Behavior normal.

## 2024-11-07 DIAGNOSIS — M25.561 ACUTE PAIN OF RIGHT KNEE: ICD-10-CM

## 2024-11-07 RX ORDER — DICLOFENAC SODIUM 75 MG/1
75 TABLET, DELAYED RELEASE ORAL 2 TIMES DAILY
Qty: 30 TABLET | Refills: 0 | Status: SHIPPED | OUTPATIENT
Start: 2024-11-07

## 2024-11-07 NOTE — TELEPHONE ENCOUNTER
Pt is has no tablets left; reports the medication has been helping her and would like a refill.     diclofenac (VOLTAREN) 75 mg EC tablet

## 2024-12-11 ENCOUNTER — VBI (OUTPATIENT)
Dept: ADMINISTRATIVE | Facility: OTHER | Age: 59
End: 2024-12-11

## 2024-12-12 NOTE — TELEPHONE ENCOUNTER
12/11/24 9:13 PM     Chart reviewed for   Cervical Cancer Screening    ; nothing is submitted to the patient's insurance at this time.     SELINA WILCOX MA   [facility]

## 2025-02-19 DIAGNOSIS — M25.561 ACUTE PAIN OF RIGHT KNEE: ICD-10-CM

## 2025-02-19 DIAGNOSIS — R60.9 DEPENDENT EDEMA: ICD-10-CM

## 2025-02-19 RX ORDER — DICLOFENAC SODIUM 75 MG/1
75 TABLET, DELAYED RELEASE ORAL 2 TIMES DAILY
Qty: 30 TABLET | Refills: 0 | Status: SHIPPED | OUTPATIENT
Start: 2025-02-19

## 2025-02-19 RX ORDER — FUROSEMIDE 20 MG/1
20 TABLET ORAL DAILY
Qty: 90 TABLET | Refills: 1 | Status: SHIPPED | OUTPATIENT
Start: 2025-02-19

## 2025-03-24 ENCOUNTER — VBI (OUTPATIENT)
Dept: ADMINISTRATIVE | Facility: OTHER | Age: 60
End: 2025-03-24

## 2025-03-24 NOTE — TELEPHONE ENCOUNTER
03/24/25 10:01 AM     Chart reviewed for   Cervical Cancer Screening    ; nothing is submitted to the patient's insurance at this time.     SELINA WILCOX MA   [facility]

## 2025-05-02 ENCOUNTER — RA CDI HCC (OUTPATIENT)
Dept: OTHER | Facility: HOSPITAL | Age: 60
End: 2025-05-02

## 2025-05-06 ENCOUNTER — TELEPHONE (OUTPATIENT)
Dept: ADMINISTRATIVE | Facility: OTHER | Age: 60
End: 2025-05-06

## 2025-05-06 ENCOUNTER — OFFICE VISIT (OUTPATIENT)
Dept: FAMILY MEDICINE CLINIC | Facility: CLINIC | Age: 60
End: 2025-05-06
Payer: COMMERCIAL

## 2025-05-06 VITALS
TEMPERATURE: 98.1 F | WEIGHT: 205.2 LBS | HEIGHT: 62 IN | OXYGEN SATURATION: 96 % | SYSTOLIC BLOOD PRESSURE: 122 MMHG | RESPIRATION RATE: 16 BRPM | HEART RATE: 67 BPM | BODY MASS INDEX: 37.76 KG/M2 | DIASTOLIC BLOOD PRESSURE: 68 MMHG

## 2025-05-06 DIAGNOSIS — Z13.220 ENCOUNTER FOR LIPID SCREENING FOR CARDIOVASCULAR DISEASE: ICD-10-CM

## 2025-05-06 DIAGNOSIS — Z13.6 ENCOUNTER FOR LIPID SCREENING FOR CARDIOVASCULAR DISEASE: ICD-10-CM

## 2025-05-06 DIAGNOSIS — E78.00 PURE HYPERCHOLESTEROLEMIA: ICD-10-CM

## 2025-05-06 DIAGNOSIS — Z00.00 ANNUAL PHYSICAL EXAM: Primary | ICD-10-CM

## 2025-05-06 DIAGNOSIS — Z13.1 SCREENING FOR DIABETES MELLITUS: ICD-10-CM

## 2025-05-06 DIAGNOSIS — M25.561 CHRONIC PAIN OF RIGHT KNEE: ICD-10-CM

## 2025-05-06 DIAGNOSIS — G89.29 CHRONIC PAIN OF RIGHT KNEE: ICD-10-CM

## 2025-05-06 DIAGNOSIS — F41.9 ANXIETY: ICD-10-CM

## 2025-05-06 DIAGNOSIS — Z12.31 ENCOUNTER FOR SCREENING MAMMOGRAM FOR BREAST CANCER: ICD-10-CM

## 2025-05-06 DIAGNOSIS — H54.8 LEGAL BLINDNESS USA: ICD-10-CM

## 2025-05-06 PROCEDURE — 99396 PREV VISIT EST AGE 40-64: CPT | Performed by: FAMILY MEDICINE

## 2025-05-06 RX ORDER — DICLOFENAC SODIUM 75 MG/1
75 TABLET, DELAYED RELEASE ORAL 2 TIMES DAILY
Qty: 30 TABLET | Refills: 0 | Status: SHIPPED | OUTPATIENT
Start: 2025-05-06

## 2025-05-06 NOTE — PATIENT INSTRUCTIONS
"Patient Education     Routine physical for adults   The Basics   Written by the doctors and editors at [facility]   What is a physical? -- A physical is a routine visit, or \"check-up,\" with your doctor. You might also hear it called a \"wellness visit\" or \"preventive visit.\"  During each visit, the doctor will:   Ask about your physical and mental health   Ask about your habits, behaviors, and lifestyle   Do an exam   Give you vaccines if needed   Talk to you about any medicines you take   Give advice about your health   Answer your questions  Getting regular check-ups is an important part of taking care of your health. It can help your doctor find and treat any problems you have. But it's also important for preventing health problems.  A routine physical is different from a \"sick visit.\" A sick visit is when you see a doctor because of a health concern or problem. Since physicals are scheduled ahead of time, you can think about what you want to ask the doctor.  How often should I get a physical? -- It depends on your age and health. In general, for people age 21 years and older:   If you are younger than 50 years, you might be able to get a physical every 3 years.   If you are 50 years or older, your doctor might recommend a physical every year.  If you have an ongoing health condition, like diabetes or high blood pressure, your doctor will probably want to see you more often.  What happens during a physical? -- In general, each visit will include:   Physical exam - The doctor or nurse will check your height, weight, heart rate, and blood pressure. They will also look at your eyes and ears. They will ask about how you are feeling and whether you have any symptoms that bother you.   Medicines - It's a good idea to bring a list of all the medicines you take to each doctor visit. Your doctor will talk to you about your medicines and answer any questions. Tell them if you are having any side effects that bother you. You " "should also tell them if you are having trouble paying for any of your medicines.   Habits and behaviors - This includes:   Your diet   Your exercise habits   Whether you smoke, drink alcohol, or use drugs   Whether you are sexually active   Whether you feel safe at home  Your doctor will talk to you about things you can do to improve your health and lower your risk of health problems. They will also offer help and support. For example, if you want to quit smoking, they can give you advice and might prescribe medicines. If you want to improve your diet or get more physical activity, they can help you with this, too.   Lab tests, if needed - The tests you get will depend on your age and situation. For example, your doctor might want to check your:   Cholesterol   Blood sugar   Iron level   Vaccines - The recommended vaccines will depend on your age, health, and what vaccines you already had. Vaccines are very important because they can prevent certain serious or deadly infections.   Discussion of screening - \"Screening\" means checking for diseases or other health problems before they cause symptoms. Your doctor can recommend screening based on your age, risk, and preferences. This might include tests to check for:   Cancer, such as breast, prostate, cervical, ovarian, colorectal, prostate, lung, or skin cancer   Sexually transmitted infections, such as chlamydia and gonorrhea   Mental health conditions like depression and anxiety  Your doctor will talk to you about the different types of screening tests. They can help you decide which screenings to have. They can also explain what the results might mean.   Answering questions - The physical is a good time to ask the doctor or nurse questions about your health. If needed, they can refer you to other doctors or specialists, too.  Adults older than 65 years often need other care, too. As you get older, your doctor will talk to you about:   How to prevent falling at " home   Hearing or vision tests   Memory testing   How to take your medicines safely   Making sure that you have the help and support you need at home  All topics are updated as new evidence becomes available and our peer review process is complete.  This topic retrieved from [employer] on: May 02, 2024.  Topic 718217 Version 1.0  Release: 32.4.3 - C32.122  © 2024 UpToDate, Inc. and/or its affiliates. All rights reserved.  Consumer Information Use and Disclaimer   Disclaimer: This generalized information is a limited summary of diagnosis, treatment, and/or medication information. It is not meant to be comprehensive and should be used as a tool to help the user understand and/or assess potential diagnostic and treatment options. It does NOT include all information about conditions, treatments, medications, side effects, or risks that may apply to a specific patient. It is not intended to be medical advice or a substitute for the medical advice, diagnosis, or treatment of a health care provider based on the health care provider's examination and assessment of a patient's specific and unique circumstances. Patients must speak with a health care provider for complete information about their health, medical questions, and treatment options, including any risks or benefits regarding use of medications. This information does not endorse any treatments or medications as safe, effective, or approved for treating a specific patient. UpToDate, Inc. and its affiliates disclaim any warranty or liability relating to this information or the use thereof.The use of this information is governed by the Terms of Use, available at https://www.woltersAloompauwer.com/en/know/clinical-effectiveness-terms. 2024© UpToDate, Inc. and its affiliates and/or licensors. All rights reserved.  Copyright   © 2024 UpToDate, Inc. and/or its affiliates. All rights reserved.

## 2025-05-06 NOTE — ASSESSMENT & PLAN NOTE
-Continue diet and lifestyle interventions  - Follow up in 6 months    Orders:  •  Lipid Panel with Direct LDL reflex; Future

## 2025-05-06 NOTE — TELEPHONE ENCOUNTER
----- Message from Duarte Swann DO sent at 5/6/2025 10:02 AM EDT -----  Regarding: Care Gap Request  05/06/25 10:02 AM    Hello, our patient Sherri Friedel has had CRC: Colonoscopy completed/performed. Please assist in updating the patient chart by pulling the document from Labs Tab within Chart Review. The date of service is 2024.     Thank you,  DO SURESH Hitchcock

## 2025-05-06 NOTE — PROGRESS NOTES
Adult Annual Physical  Name: Sherri Friedel      : 1965      MRN: [id number]  Encounter Provider: Duarte Swann DO  Encounter Date: 2025   Encounter department: [facility]    :  Assessment & Plan  Annual physical exam         Chronic pain of right knee  - No trauma or injury.  This is likely osteoarthritis.  Controlled with current management.  Home exercises provided.  Follow-up as needed.  Orders:  •  diclofenac (VOLTAREN) 75 mg EC tablet; Take 1 tablet (75 mg total) by mouth 2 (two) times a day    Encounter for screening mammogram for breast cancer    Orders:  •  Mammo screening bilateral w 3d and cad; Future    Screening for diabetes mellitus    Orders:  •  Comprehensive metabolic panel; Future    Encounter for lipid screening for cardiovascular disease         Pure hypercholesterolemia  -Continue diet and lifestyle interventions  - Follow up in 6 months    Orders:  •  Lipid Panel with Direct LDL reflex; Future    Legal blindness [place]         Anxiety  Stable.  No SI or HI.  Continue off medication.           Preventive Screenings:  - Diabetes Screening: orders placed  - Cholesterol Screening: has hyperlipidemia and orders placed   - Hepatitis C screening: screening up-to-date   - HIV screening: patient declines   - Cervical cancer screening: patient declines   - Breast cancer screening: orders placed   - Colon cancer screening: screening up-to-date   - Lung cancer screening: screening not indicated     Immunizations:  - Immunizations due: Tdap and Zoster (Shingrix)  - Risks/benefits immunizations discussed      Counseling/Anticipatory Guidance:  - Alcohol: discussed moderation in alcohol intake and recommendations for healthy alcohol use.   - Drug use: discussed harms of illicit drug use and how it can negatively impact mental/physical health.   - Tobacco use: discussed harms of tobacco use and management options for quitting.   - Dental health: discussed importance of regular  tooth brushing, flossing, and dental visits.   - Sexual health: discussed sexually transmitted diseases, partner selection, use of condoms, avoidance of unintended pregnancy, and contraceptive alternatives.   - Diet: discussed recommendations for a healthy/well-balanced diet.   - Exercise: the importance of regular exercise/physical activity was discussed. Recommend exercise 3-5 times per week for at least 30 minutes.          History of Present Illness     Adult Annual Physical:  Patient presents for annual physical.     Diet and Physical Activity:  - Diet/Nutrition: no special diet.  - Exercise: strength training exercises and 5-7 times a week on average.    Depression Screening:  - PHQ-2 Score: 0    General Health:  - Sleep: sleeps poorly and 4-6 hours of sleep on average.  - Hearing: normal hearing right ear and normal hearing left ear.  - Vision: vision problems.  - Dental: regular dental visits, brushes teeth twice daily and does not floss.    /GYN Health:  - Follows with GYN: no.   - History of STDs: no    Advanced Care Planning:  - Has an advanced directive?: no    - Has a durable medical POA?: no    - ACP document given to patient?: no      Review of Systems   Constitutional:  Negative for chills and fever.   HENT:  Negative for ear pain and sore throat.    Eyes:  Negative for pain and visual disturbance.   Respiratory:  Negative for cough and shortness of breath.    Cardiovascular:  Negative for chest pain and palpitations.   Gastrointestinal:  Negative for abdominal pain and vomiting.   Genitourinary:  Negative for dysuria and hematuria.   Musculoskeletal:  Positive for arthralgias. Negative for back pain.   Skin:  Negative for color change and rash.   Neurological:  Negative for seizures and syncope.   Psychiatric/Behavioral:  Negative for confusion, sleep disturbance and suicidal ideas. The patient is not nervous/anxious.    All other systems reviewed and are negative.        Objective   /68 (BP  "Location: Left arm, Patient Position: Sitting, Cuff Size: Large)   Pulse 67   Temp 98.1 °F (36.7 °C) (Tympanic)   Resp 16   Ht 5' 1.58\" (1.564 m)   Wt 93.1 kg (205 lb 3.2 oz)   SpO2 96%   BMI 38.05 kg/m²     Physical Exam  Vitals and nursing note reviewed.   Constitutional:       General: She is not in acute distress.     Appearance: Normal appearance.   HENT:      Head: Normocephalic and atraumatic.      Right Ear: Tympanic membrane and external ear normal.      Left Ear: Tympanic membrane and external ear normal.      Nose: Nose normal.      Mouth/Throat:      Mouth: Mucous membranes are moist.   Eyes:      Extraocular Movements: Extraocular movements intact.      Conjunctiva/sclera: Conjunctivae normal.      Pupils: Pupils are equal, round, and reactive to light.   Cardiovascular:      Rate and Rhythm: Normal rate and regular rhythm.      Pulses: Normal pulses.      Heart sounds: Normal heart sounds. No murmur heard.  Pulmonary:      Effort: Pulmonary effort is normal.      Breath sounds: Normal breath sounds. No wheezing, rhonchi or rales.   Abdominal:      General: Bowel sounds are normal.      Palpations: Abdomen is soft.      Tenderness: There is no abdominal tenderness. There is no guarding.   Musculoskeletal:         General: Normal range of motion.      Cervical back: Normal range of motion.      Right lower leg: No edema.      Left lower leg: No edema.   Lymphadenopathy:      Cervical: No cervical adenopathy.   Skin:     General: Skin is warm.      Capillary Refill: Capillary refill takes less than 2 seconds.   Neurological:      General: No focal deficit present.      Mental Status: She is alert and oriented to person, place, and time.   Psychiatric:         Mood and Affect: Mood normal.         Behavior: Behavior normal.         "

## 2025-05-07 NOTE — TELEPHONE ENCOUNTER
Upon review of the In Basket request we have found/obtained the documentation. After careful review of the document we are unable to complete this request for CRC: Cologuard because the documentation does not have the result(s) needed to close the requested care gap(s). Result states sample could not be processed.     Any additional questions or concerns should be emailed to the Practice Liaisons via the appropriate education email address, please do not reply via In Basket.    Thank you  Dee Montoya MA   [facility]

## 2025-05-08 PROBLEM — G89.29 CHRONIC PAIN OF RIGHT KNEE: Status: ACTIVE | Noted: 2024-10-18

## 2025-05-08 NOTE — ASSESSMENT & PLAN NOTE
- No trauma or injury.  This is likely osteoarthritis.  Controlled with current management.  Home exercises provided.  Follow-up as needed.  Orders:  •  diclofenac (VOLTAREN) 75 mg EC tablet; Take 1 tablet (75 mg total) by mouth 2 (two) times a day